# Patient Record
Sex: MALE | Race: OTHER | Employment: FULL TIME | URBAN - METROPOLITAN AREA
[De-identification: names, ages, dates, MRNs, and addresses within clinical notes are randomized per-mention and may not be internally consistent; named-entity substitution may affect disease eponyms.]

---

## 2017-02-12 ENCOUNTER — HOSPITAL ENCOUNTER (INPATIENT)
Age: 42
LOS: 2 days | Discharge: HOME | DRG: 897 | End: 2017-02-14
Attending: EMERGENCY MEDICINE | Admitting: INTERNAL MEDICINE
Payer: SELF-PAY

## 2017-02-12 DIAGNOSIS — F10.932 ALCOHOL WITHDRAWAL, WITH PERCEPTUAL DISTURBANCE (HCC): Primary | ICD-10-CM

## 2017-02-12 PROBLEM — F10.939 ALCOHOL WITHDRAWAL (HCC): Status: ACTIVE | Noted: 2017-02-12

## 2017-02-12 PROBLEM — R74.8 ELEVATED LIVER ENZYMES: Status: ACTIVE | Noted: 2017-02-12

## 2017-02-12 PROBLEM — R44.3 HALLUCINATIONS: Status: ACTIVE | Noted: 2017-02-12

## 2017-02-12 LAB
ALBUMIN SERPL BCP-MCNC: 4.9 G/DL (ref 3.4–5)
ALBUMIN/GLOB SERPL: 1.7 {RATIO} (ref 0.8–1.7)
ALP SERPL-CCNC: 40 U/L (ref 45–117)
ALT SERPL-CCNC: 146 U/L (ref 16–61)
AMPHET UR QL SCN: NEGATIVE
ANION GAP BLD CALC-SCNC: 10 MMOL/L (ref 3–18)
APPEARANCE UR: CLEAR
AST SERPL W P-5'-P-CCNC: 123 U/L (ref 15–37)
BARBITURATES UR QL SCN: NEGATIVE
BASOPHILS # BLD AUTO: 0 K/UL (ref 0–0.06)
BASOPHILS # BLD: 1 % (ref 0–2)
BENZODIAZ UR QL: NEGATIVE
BILIRUB DIRECT SERPL-MCNC: 0.3 MG/DL (ref 0–0.2)
BILIRUB SERPL-MCNC: 0.9 MG/DL (ref 0.2–1)
BILIRUB UR QL: NEGATIVE
BUN SERPL-MCNC: 9 MG/DL (ref 7–18)
BUN/CREAT SERPL: 12 (ref 12–20)
CALCIUM SERPL-MCNC: 8.7 MG/DL (ref 8.5–10.1)
CANNABINOIDS UR QL SCN: NEGATIVE
CHLORIDE SERPL-SCNC: 103 MMOL/L (ref 100–108)
CK MB CFR SERPL CALC: 0.6 % (ref 0–4)
CK MB SERPL-MCNC: 2.2 NG/ML (ref 0.5–3.6)
CK SERPL-CCNC: 368 U/L (ref 39–308)
CO2 SERPL-SCNC: 23 MMOL/L (ref 21–32)
COCAINE UR QL SCN: NEGATIVE
COLOR UR: YELLOW
CREAT SERPL-MCNC: 0.74 MG/DL (ref 0.6–1.3)
DIFFERENTIAL METHOD BLD: ABNORMAL
EOSINOPHIL # BLD: 0 K/UL (ref 0–0.4)
EOSINOPHIL NFR BLD: 0 % (ref 0–5)
ERYTHROCYTE [DISTWIDTH] IN BLOOD BY AUTOMATED COUNT: 12.5 % (ref 11.6–14.5)
ETHANOL SERPL-MCNC: <3 MG/DL (ref 0–3)
GLOBULIN SER CALC-MCNC: 2.9 G/DL (ref 2–4)
GLUCOSE SERPL-MCNC: 88 MG/DL (ref 74–99)
GLUCOSE UR STRIP.AUTO-MCNC: NEGATIVE MG/DL
HCT VFR BLD AUTO: 38.9 % (ref 36–48)
HDSCOM,HDSCOM: NORMAL
HGB BLD-MCNC: 13.4 G/DL (ref 13–16)
HGB UR QL STRIP: NEGATIVE
KETONES UR QL STRIP.AUTO: NEGATIVE MG/DL
LEUKOCYTE ESTERASE UR QL STRIP.AUTO: NEGATIVE
LYMPHOCYTES # BLD AUTO: 25 % (ref 21–52)
LYMPHOCYTES # BLD: 2 K/UL (ref 0.9–3.6)
MAGNESIUM SERPL-MCNC: 2 MG/DL (ref 1.8–2.4)
MCH RBC QN AUTO: 34.3 PG (ref 24–34)
MCHC RBC AUTO-ENTMCNC: 34.4 G/DL (ref 31–37)
MCV RBC AUTO: 99.5 FL (ref 74–97)
METHADONE UR QL: NEGATIVE
MONOCYTES # BLD: 1.1 K/UL (ref 0.05–1.2)
MONOCYTES NFR BLD AUTO: 13 % (ref 3–10)
NEUTS SEG # BLD: 5.1 K/UL (ref 1.8–8)
NEUTS SEG NFR BLD AUTO: 61 % (ref 40–73)
NITRITE UR QL STRIP.AUTO: NEGATIVE
OPIATES UR QL: NEGATIVE
PCP UR QL: NEGATIVE
PH UR STRIP: 5 [PH] (ref 5–8)
PHOSPHATE SERPL-MCNC: 3 MG/DL (ref 2.5–4.9)
PLATELET # BLD AUTO: 173 K/UL (ref 135–420)
PMV BLD AUTO: 10.9 FL (ref 9.2–11.8)
POTASSIUM SERPL-SCNC: 3.7 MMOL/L (ref 3.5–5.5)
PROT SERPL-MCNC: 7.8 G/DL (ref 6.4–8.2)
PROT UR STRIP-MCNC: NEGATIVE MG/DL
RBC # BLD AUTO: 3.91 M/UL (ref 4.7–5.5)
SODIUM SERPL-SCNC: 136 MMOL/L (ref 136–145)
SP GR UR REFRACTOMETRY: 1.01 (ref 1–1.03)
TROPONIN I SERPL-MCNC: <0.02 NG/ML (ref 0–0.04)
UROBILINOGEN UR QL STRIP.AUTO: 0.2 EU/DL (ref 0.2–1)
WBC # BLD AUTO: 8.2 K/UL (ref 4.6–13.2)

## 2017-02-12 PROCEDURE — 65270000029 HC RM PRIVATE

## 2017-02-12 PROCEDURE — 81003 URINALYSIS AUTO W/O SCOPE: CPT | Performed by: EMERGENCY MEDICINE

## 2017-02-12 PROCEDURE — 74011250636 HC RX REV CODE- 250/636: Performed by: EMERGENCY MEDICINE

## 2017-02-12 PROCEDURE — 93005 ELECTROCARDIOGRAM TRACING: CPT

## 2017-02-12 PROCEDURE — 96375 TX/PRO/DX INJ NEW DRUG ADDON: CPT

## 2017-02-12 PROCEDURE — 65660000000 HC RM CCU STEPDOWN

## 2017-02-12 PROCEDURE — 74011000250 HC RX REV CODE- 250: Performed by: EMERGENCY MEDICINE

## 2017-02-12 PROCEDURE — 96365 THER/PROPH/DIAG IV INF INIT: CPT

## 2017-02-12 PROCEDURE — 83735 ASSAY OF MAGNESIUM: CPT | Performed by: EMERGENCY MEDICINE

## 2017-02-12 PROCEDURE — 99285 EMERGENCY DEPT VISIT HI MDM: CPT

## 2017-02-12 PROCEDURE — 80076 HEPATIC FUNCTION PANEL: CPT | Performed by: EMERGENCY MEDICINE

## 2017-02-12 PROCEDURE — 84100 ASSAY OF PHOSPHORUS: CPT | Performed by: EMERGENCY MEDICINE

## 2017-02-12 PROCEDURE — 82550 ASSAY OF CK (CPK): CPT | Performed by: EMERGENCY MEDICINE

## 2017-02-12 PROCEDURE — 80048 BASIC METABOLIC PNL TOTAL CA: CPT | Performed by: EMERGENCY MEDICINE

## 2017-02-12 PROCEDURE — 85025 COMPLETE CBC W/AUTO DIFF WBC: CPT | Performed by: EMERGENCY MEDICINE

## 2017-02-12 PROCEDURE — 80307 DRUG TEST PRSMV CHEM ANLYZR: CPT | Performed by: EMERGENCY MEDICINE

## 2017-02-12 PROCEDURE — 94762 N-INVAS EAR/PLS OXIMTRY CONT: CPT

## 2017-02-12 RX ORDER — LORAZEPAM 2 MG/ML
2 INJECTION INTRAMUSCULAR
Status: COMPLETED | OUTPATIENT
Start: 2017-02-12 | End: 2017-02-12

## 2017-02-12 RX ORDER — LORAZEPAM 2 MG/ML
2 INJECTION INTRAMUSCULAR SEE ADMIN INSTRUCTIONS
Status: DISCONTINUED | OUTPATIENT
Start: 2017-02-12 | End: 2017-02-14 | Stop reason: SDUPTHER

## 2017-02-12 RX ORDER — HALOPERIDOL 5 MG/ML
5 INJECTION INTRAMUSCULAR
Status: COMPLETED | OUTPATIENT
Start: 2017-02-12 | End: 2017-02-12

## 2017-02-12 RX ORDER — LORAZEPAM 2 MG/ML
1 INJECTION INTRAMUSCULAR
Status: COMPLETED | OUTPATIENT
Start: 2017-02-12 | End: 2017-02-12

## 2017-02-12 RX ORDER — DIAZEPAM 10 MG/2ML
10 INJECTION INTRAMUSCULAR
Status: COMPLETED | OUTPATIENT
Start: 2017-02-12 | End: 2017-02-12

## 2017-02-12 RX ORDER — SODIUM CHLORIDE 0.9 % (FLUSH) 0.9 %
5-10 SYRINGE (ML) INJECTION AS NEEDED
Status: DISCONTINUED | OUTPATIENT
Start: 2017-02-12 | End: 2017-02-13

## 2017-02-12 RX ORDER — SODIUM CHLORIDE 0.9 % (FLUSH) 0.9 %
5-10 SYRINGE (ML) INJECTION EVERY 8 HOURS
Status: DISCONTINUED | OUTPATIENT
Start: 2017-02-12 | End: 2017-02-13

## 2017-02-12 RX ADMIN — LORAZEPAM 2 MG: 2 INJECTION, SOLUTION INTRAMUSCULAR; INTRAVENOUS at 15:25

## 2017-02-12 RX ADMIN — HALOPERIDOL LACTATE 5 MG: 5 INJECTION, SOLUTION INTRAMUSCULAR at 17:10

## 2017-02-12 RX ADMIN — FOLIC ACID: 5 INJECTION, SOLUTION INTRAMUSCULAR; INTRAVENOUS; SUBCUTANEOUS at 14:00

## 2017-02-12 RX ADMIN — DIAZEPAM 10 MG: 5 INJECTION, SOLUTION INTRAMUSCULAR; INTRAVENOUS at 17:10

## 2017-02-12 RX ADMIN — LORAZEPAM 1 MG: 2 INJECTION, SOLUTION INTRAMUSCULAR; INTRAVENOUS at 13:54

## 2017-02-12 RX ADMIN — Medication 10 ML: at 13:55

## 2017-02-12 RX ADMIN — DIAZEPAM 10 MG: 5 INJECTION, SOLUTION INTRAMUSCULAR; INTRAVENOUS at 19:50

## 2017-02-12 RX ADMIN — SODIUM CHLORIDE 1000 ML: 900 INJECTION, SOLUTION INTRAVENOUS at 13:54

## 2017-02-12 RX ADMIN — LORAZEPAM 2 MG: 2 INJECTION, SOLUTION INTRAMUSCULAR; INTRAVENOUS at 15:57

## 2017-02-12 NOTE — ED NOTES
Patient noted to have worse tremors than before ativan administration. ciwa of 21 and 2mg given. MD Iram Torre made aware.

## 2017-02-12 NOTE — H&P
Hospitalist Admission Note    NAME:  Johnny La   :   1975   MRN:   [id number]     Date/Time:  2017 5:41 PM    Subjective:     CHIEF COMPLAINT:    Chief Complaint   Patient presents with   Shelagh Liming    Delirium Tremens (DTS)       HISTORY OF PRESENT ILLNESS:     [facility] is a 46 y.o.  male,does not speak English,the family here translating. According to family members,patient drinks heavily since he was 27 y.old. He drinks 12 pack/daily of beer. Five days ago he decided to quit for the first time and next few days,he has become restless,shaky and he is seeing people and objects which don't exist.He is sweaty and has lost appetite. Albino Gamboa family has decided to bring him to the ER. Tox screen showed alcohol<3. Pt is admitted for alcohol withdrawal with delirium Tremens. Past Medical History   Diagnosis Date    Alcoholic [facility])         Social History   Substance Use Topics    Smoking status: Never Smoker    Smokeless tobacco: Not on file    Alcohol use Yes        History reviewed. No pertinent family history. No Known Allergies     Prior to Admission medications    Not on File       REVIEW OF SYSTEMS:    Constitutional:  Shaking,restlessness,loss of appetite  HEENT:  No headache or visual changes  Cardiovascular:  No chest pain, no palpitations. Respiratory:  No coughing, wheezing, or shortness of breath. GI:  No abdominal pain. No nausea or vomiting. No diarrhea  :  No hematuria or dysuria. No frequency, retention, urinary incontinence. Skin:  No rashes or moles  Neuro:  Confusion,tremors. Hematological:  No bruising or bleeding  Endocrine:  No diabetes or thyroid disease  Objective:   VITALS:       Visit Vitals    /85    Pulse (!) 105    Temp 99.5 °F (37.5 °C)    Resp 24    Ht 5' 4\" (1.626 m)    Wt 81.6 kg (180 lb)    SpO2 98%    BMI 30.9 kg/m2       O2 Device: Room air    PHYSICAL EXAM:   General:    Restless,shaky,anxious  HEENT:  Pupils equal.  Sclera anicteric. Conjunctiva pink. Mucous membranes                           moist  Neck:  Supple. Trachea midline. No accessory muscle use. No thyromegaly. No jugular venous distention  CV:                  Regular rate and rhythm. Lungs:   Clear to auscultation bilaterally. No Wheezing or Rhonchi. No rales. Normal to percussion  Abdomen:   Soft, non-tender. Not distended. Bowel sounds normal. No organomegaly  Extremities: No cyanosis. No edema. No clubbing  Neurologic: Alert and alert. Having involuntary upper extremities tremors. Skin:                Warm and dry. No rashes. Psych:            Agitated,restless,confused.       LAB DATA REVIEWED:    Recent Results (from the past 24 hour(s))   URINALYSIS W/ RFLX MICROSCOPIC    Collection Time: 02/12/17  1:04 PM   Result Value Ref Range    Color YELLOW      Appearance CLEAR      Specific gravity 1.012 1.005 - 1.030      pH (UA) 5.0 5.0 - 8.0      Protein NEGATIVE  NEG mg/dL    Glucose NEGATIVE  NEG mg/dL    Ketone NEGATIVE  NEG mg/dL    Bilirubin NEGATIVE  NEG      Blood NEGATIVE  NEG      Urobilinogen 0.2 0.2 - 1.0 EU/dL    Nitrites NEGATIVE  NEG      Leukocyte Esterase NEGATIVE  NEG     DRUG SCREEN, URINE    Collection Time: 02/12/17  1:04 PM   Result Value Ref Range    BENZODIAZEPINE NEGATIVE  NEG      BARBITURATES NEGATIVE  NEG      THC (TH-CANNABINOL) NEGATIVE  NEG      OPIATES NEGATIVE  NEG      PCP(PHENCYCLIDINE) NEGATIVE  NEG      COCAINE NEGATIVE  NEG      AMPHETAMINE NEGATIVE  NEG      METHADONE NEGATIVE       HDSCOM (NOTE)    EKG, 12 LEAD, INITIAL    Collection Time: 02/12/17  1:16 PM   Result Value Ref Range    Ventricular Rate 87 BPM    Atrial Rate 87 BPM    P-R Interval 158 ms    QRS Duration 102 ms    Q-T Interval 398 ms    QTC Calculation (Bezet) 478 ms    Calculated P Axis 44 degrees    Calculated R Axis 41 degrees    Calculated T Axis 24 degrees    Diagnosis       Normal sinus rhythm  Normal ECG  No previous ECGs available     CBC WITH AUTOMATED DIFF    Collection Time: 02/12/17  1:29 PM   Result Value Ref Range    WBC 8.2 4.6 - 13.2 K/uL    RBC 3.91 (L) 4.70 - 5.50 M/uL    HGB 13.4 13.0 - 16.0 g/dL    HCT 38.9 36.0 - 48.0 %    MCV 99.5 (H) 74.0 - 97.0 FL    MCH 34.3 (H) 24.0 - 34.0 PG    MCHC 34.4 31.0 - 37.0 g/dL    RDW 12.5 11.6 - 14.5 %    PLATELET 326 441 - 743 K/uL    MPV 10.9 9.2 - 11.8 FL    NEUTROPHILS 61 40 - 73 %    LYMPHOCYTES 25 21 - 52 %    MONOCYTES 13 (H) 3 - 10 %    EOSINOPHILS 0 0 - 5 %    BASOPHILS 1 0 - 2 %    ABS. NEUTROPHILS 5.1 1.8 - 8.0 K/UL    ABS. LYMPHOCYTES 2.0 0.9 - 3.6 K/UL    ABS. MONOCYTES 1.1 0.05 - 1.2 K/UL    ABS. EOSINOPHILS 0.0 0.0 - 0.4 K/UL    ABS. BASOPHILS 0.0 0.0 - 0.06 K/UL    DF AUTOMATED     METABOLIC PANEL, BASIC    Collection Time: 02/12/17  1:29 PM   Result Value Ref Range    Sodium 136 136 - 145 mmol/L    Potassium 3.7 3.5 - 5.5 mmol/L    Chloride 103 100 - 108 mmol/L    CO2 23 21 - 32 mmol/L    Anion gap 10 3.0 - 18 mmol/L    Glucose 88 74 - 99 mg/dL    BUN 9 7.0 - 18 MG/DL    Creatinine 0.74 0.6 - 1.3 MG/DL    BUN/Creatinine ratio 12 12 - 20      GFR est AA >60 >60 ml/min/1.73m2    GFR est non-AA >60 >60 ml/min/1.73m2    Calcium 8.7 8.5 - 10.1 MG/DL   HEPATIC FUNCTION PANEL    Collection Time: 02/12/17  1:29 PM   Result Value Ref Range    Protein, total 7.8 6.4 - 8.2 g/dL    Albumin 4.9 3.4 - 5.0 g/dL    Globulin 2.9 2.0 - 4.0 g/dL    A-G Ratio 1.7 0.8 - 1.7      Bilirubin, total 0.9 0.2 - 1.0 MG/DL    Bilirubin, direct 0.3 (H) 0.0 - 0.2 MG/DL    Alk.  phosphatase 40 (L) 45 - 117 U/L    AST (SGOT) 123 (H) 15 - 37 U/L    ALT (SGPT) 146 (H) 16 - 61 U/L   CARDIAC PANEL,(CK, CKMB & TROPONIN)    Collection Time: 02/12/17  1:29 PM   Result Value Ref Range     (H) 39 - 308 U/L    CK - MB 2.2 0.5 - 3.6 ng/ml    CK-MB Index 0.6 0.0 - 4.0 %    Troponin-I, Qt. <0.02 0.0 - 0.045 NG/ML   ETHYL ALCOHOL    Collection Time: 02/12/17  1:29 PM   Result Value Ref Range ALCOHOL(ETHYL),SERUM <3 0 - 3 MG/DL   MAGNESIUM    Collection Time: 02/12/17  1:29 PM   Result Value Ref Range    Magnesium 2.0 1.8 - 2.4 mg/dL   PHOSPHORUS    Collection Time: 02/12/17  1:29 PM   Result Value Ref Range    Phosphorus 3.0 2.5 - 4.9 MG/DL       Assessment/Plan:      Principal Problem:    Alcohol withdrawal ([address] (2/12/2017)    Active Problems:    Elevated liver enzymes (2/12/2017)      Hallucinations (2/12/2017)      Delirium Tremens  ___________________________________________________  PLAN:    1. Alcohol withdrawal with delirium Tremens    -On CIWA protocol    -On MVI,Thiamine,Folic acid    -Life coaching    -PT/OT    -Refer to group therapy on discharge  2. Elevated liver enzymes    -Due to alcohol abuse. Will monitor  DVT prophylaxis  Full code    ___________________________________________________  Admitting Physician: Ramírez Judge MD

## 2017-02-12 NOTE — IP AVS SNAPSHOT
[address] [place] [address] 
[phone] Patient: Jose Hooks MRN: [id number] :1975 You are allergic to the following No active allergies Immunizations Administered for This Admission Name Date Influenza Vaccine (Quad) PF 2017 Recent Documentation Height Weight BMI Smoking Status 1.626 m 81.6 kg 30.9 kg/m2 Never Smoker Emergency Contacts Name Discharge Info Relation Home Work Mobile Delano Lauren DISCHARGE CAREGIVER [3] Brother [24]   [phone] About your hospitalization You were admitted on:  2017 You last received care in the:  [address] NEURO MED You were discharged on:  2017 Unit phone number:  [phone] Why you were hospitalized Your primary diagnosis was:  Alcohol Withdrawal (Hcc) Your diagnoses also included:  Elevated Liver Enzymes, Hallucinations Providers Seen During Your Hospitalizations Provider Role Specialty Primary office phone Salome Christensen DO Attending Provider Emergency Medicine [phone] Bran Parra DO Attending Provider Emergency Medicine [phone] Tacho Terry MD Attending Provider Internal Medicine [phone] Abdoulaye Gaines MD Attending Provider Internal Medicine [phone] Your Primary Care Physician (PCP) Primary Care Physician Office Phone Office Fax Darlyn Washington [phone] Follow-up Information Follow up With Details Comments Contact Info Taiwo Doll NP On 2017 at 12:15 pm [address] [address] 
[phone] [facility]  Schedule an appointment as soon as possible for a visit  [facility] [id number] [phone] Current Discharge Medication List  
  
 START taking these medications Dose & Instructions Dispensing Information Comments Morning Noon Evening Bedtime  
 diazePAM 5 mg tablet Commonly known as:  VALIUM Your next dose is: Today, Tomorrow Other:  _________ Take 1 tab every 6 hrs for 1 day then every 12 hrs for 1 day then stop Quantity:  6 Tab Refills:  0 Where to Get Your Medications Information on where to get these meds will be given to you by the nurse or doctor. ! Ask your nurse or doctor about these medications  
  diazePAM 5 mg tablet Discharge Instructions DISCHARGE SUMMARY from Nurse The following personal items are in your possession at time of discharge: 
 
Dental Appliances: None Visual Aid: None Home Medications: None Jewelry: None PATIENT INSTRUCTIONS: 
 
 
F-face looks uneven A-arms unable to move or move unevenly S-speech slurred or non-existent T-time-call 911 as soon as signs and symptoms begin-DO NOT go Back to bed or wait to see if you get better-TIME IS BRAIN. Warning Signs of HEART ATTACK Call 911 if you have these symptoms: 
? Chest discomfort. Most heart attacks involve discomfort in the center of the chest that lasts more than a few minutes, or that goes away and comes back. It can feel like uncomfortable pressure, squeezing, fullness, or pain. ? Discomfort in other areas of the upper body. Symptoms can include pain or discomfort in one or both arms, the back, neck, jaw, or stomach. ? Shortness of breath with or without chest discomfort. ? Other signs may include breaking out in a cold sweat, nausea, or lightheadedness. Don't wait more than five minutes to call [address]! Fast action can save your life. Calling 911 is almost always the fastest way to get lifesaving treatment. Emergency Medical Services staff can begin treatment when they arrive  up to an hour sooner than if someone gets to the hospital by car. The discharge information has been reviewed with the patient. The patient verbalized understanding. Discharge medications reviewed with the patient and appropriate educational materials and side effects teaching were provided. Patient armband removed and shredded MyChart Activation Thank you for requesting access to [email or web address]. Please follow the instructions below to securely access and download your online medical record. [email or web address] allows you to send messages to your doctor, view your test results, renew your prescriptions, schedule appointments, and more. How Do I Sign Up? 1. In your internet browser, go to [email or web address].Little Borrowed Dress 
2. Click on the First Time User? Click Here link in the Sign In box. You will be redirect to the New Member Sign Up page. 3. Enter your marker.to Access Code exactly as it appears below. You will not need to use this code after youve completed the sign-up process. If you do not sign up before the expiration date, you must request a new code. [email or web address] Access Code: [email or web address] Expires: 2017  1:40 PM (This is the date your marker.to access code will ) 4. Enter the last four digits of your Social Security Number (xxxx) and Date of Birth (mm/dd/yyyy) as indicated and click Submit. You will be taken to the next sign-up page. 5. Create a marker.to ID. This will be your marker.to login ID and cannot be changed, so think of one that is secure and easy to remember. 6. Create a marker.to password. You can change your password at any time. 7. Enter your Password Reset Question and Answer. This can be used at a later time if you forget your password. 8. Enter your e-mail address. You will receive e-mail notification when new information is available in [address] 9. Click Sign Up. You can now view and download portions of your medical record. 10. Click the Download Summary menu link to download a portable copy of your medical information. Additional Information If you have questions, please visit the Frequently Asked Questions section of the [employer] website at [email or web address] Remember, [employer] is NOT to be used for urgent needs. For medical emergencies, dial 911. Desintoxicación y síndrome de abstinencia del alcohol: Instrucciones de cuidado - [ Alcohol Detoxification and Withdrawal: Care Instructions ] Instrucciones de cuidado Si usted nicanor alcohol de forma regular y [place] as[place] de hacerlo de  repentina, puede experimentar algunos problemas físicos y emocionales mientras el alcohol desaparece de huang cuerpo. La palabra desintoxicación significa eliminar el alcohol de huang cuerpo. A los problemas físicos y emocionales que podrían ocurrir larry la desintoxicación se les conoce jayden síndrome de abstinencia. Los síntomas del síndrome de abstinencia pueden ser alarmantes y peligrosos. Entre los síntomas leves se encuentran náuseas y vómito, sudoración, temblores y preocupación intensa. Entre los síntomas graves se encuentran confusión e irritabilidad, sentir cosas en huang cuerpo que no están allí, bolivar y oír cosas que no existen y tener temblores. Puede que hasta tenga convulsiones. Si dia síntomas se vuelven graves usted debe visitar a un [place] que beben grandes cantidades de alcohol no deberían intentar la desintoxicación en huang hogar. [address] a un síndrome grave de abstinencia del alcohol. Los síntomas del síndrome de abstinencia de alcohol pueden comenzar de 4 a 12 horas después de dejar de beber alcohol.  [address] puede ser [facility] síntomas demoren en presentarse varios días después de huang última bebida. Los síntomas del síndrome de abstinencia de alcohol pueden durar unos cuantos días. Dejar de beber alcohol es difícil. Bhumi, cuando haya eliminado el alcohol de huang cuerpo, usted podrá iniciar arsenio nueva etapa de huang marva, sara de la carga de la [place]. La atención de seguimiento es arsenio parte clave de huang tratamiento y seguridad. Asegúrese de hacer y acudir a todas las citas, y llame a huang médico si está teniendo problemas. También es arsenio buena idea saber los resultados de dia exámenes y mantener arsenio lista de los medicamentos que logan. Cómo puede cuidarse en el hogar? · Antes de comenzar, hable con huang médico sobre huang plan de dejar de beber. Asegúrese de ser completamente honesto con huang médico acerca de cuánto ha estado bebiendo. Huang médico sabrá si necesita desintoxicarse en un centro médico supervisado. · Lingle los medicamentos exactamente según las indicaciones. Llame a huang médico si justin estar teniendo un problema con huang medicamento. · Asegúrese de que alguien en quien usted confía lo acompañe todo el tiempo. Stephany que dia amigos y familiares tomen turnos para quedarse con usted hasta que termine la desintoxicación. · Coloque arsenio lista de números de emergencia cerca del teléfono. Esta debe incluir los teléfonos de huang médico, la [facility], el hospital y la lou[place] de urgencias más cercanos, y vecinos que puedan ayudar si es necesario. · Asegúrese de eliminar todo el alcohol que tenga en la casa antes de comenzar. [place] incluye bebidas y medicamentos, alcohol isopropílico (de frotar) y ciertos saborizantes jayden el extracto de vainilla. · Manténgase alejado de dia \"compañeros de bebida\" mientras hace la desintoxicación. · Mantenga huang ambiente en calma. La christi y la [place], así jayden un lugar cómodo para sentarse o acostarse pueden facilitar el proceso.  
· Sofi muchos líquidos y coma refrigerios jayden frutas, queso y galletas saladas, y \"pretzels\". Los alimentos ricos en carbohidratos podrían ayudar a reducir la ansiedad por beber alcohol. · Trate de comprender que la desintoxicación será difícil. · Tenga en cuenta que las personas que lo vigilan larry la desintoxicación están ahí para ayudar. Explíqueles antes de comenzar que usted podría empezar a actuar jayden arsenio persona diferente hasta que termine la desintoxicación. · Piense en unirse a un casie de apoyo jayden Alcohólicos Anónimos. Compartir dia experiencias con [facility] que enfrentan problemas similares puede ayudarle a sentirse menos abrumado. · Guarde los números para estas líneas telefónicas nacionales de ayuda para prevenir el suicidio: 2-143-356-TALK ([phone]) y 8-411-JLPQSHH ([phone]). Si usted o alguien que conoce habla acerca del suicidio o de sentirse desesperanzado, busque ayuda inmediatamente. Cuándo debe pedir ayuda? Llame al 911 en cualquier momento que considere que necesita atención de [place]. Por ejemplo, llame si: 
· Siente que no puede dejar de lastimarse a sí mismo o a otra persona. · Vomita muchas veces y no puede dejar de hacerlo. · Vomita joselo o algo parecido a granos de café molido. · Tiene problemas para respirar o respira muy rápido. · Huang corazón late más de 120 veces por minuto y la frecuencia no disminuye. · Tiene dolor de pecho. · Tiene convulsiones. · Ve o siente cosas que no existen (alucina). Si usted está cuidando a alguien que está pasando por arsenio desintoxicación, llame si: 
· La persona se desmaya (pierde el conocimiento). · La persona ve o siente cosas que no existen y ve u oye las mismas cosas muchas veces. · La persona está muy agitada y no se calma. · La persona se vuelve violenta o amenaza con ser Herrera Nghia. · La persona tiene convulsiones. Llame a huang médico ahora mismo o busque atención médica inmediata si: · Tiene fiebre cuco. · Tiene dolor abdominal intenso. · Tiembla mucho. Preste especial atención a los cambios en huang armando y asegúrese de comunicarse con huang médico si: 
· No mejora jayden se esperaba. Dónde puede encontrar más información en inglés? April Carvalho a [email or web address] Gege Zaragoza C412 en la búsqueda para aprender más acerca de \"Desintoxicación y síndrome de abstinencia del alcohol: Instrucciones de cuidado - [ Alcohol Detoxification and Withdrawal: Care Instructions ]. \" 
Revisado: 25 godwin, 2016 Versión del contenido: 11.1 © 0145-5620 Healthwise, Incorporated. Las instrucciones de cuidado fueron adaptadas bajo licencia por [employer]ctions (which disclaims liability or warranty for this information). Si usted tiene [place] afección médica o sobre estas instrucciones, siempre pregunte a huang profesional de armando. Healthwise, Incorporated niega toda garantía o responsabilidad por huang uso de esta información. Discharge Orders None [employer] Announcement We are excited to announce that we are making your provider's discharge notes available to you in [employer]. You will see these notes when they are completed and signed by the physician that discharged you from your recent hospital stay. If you have any questions or concerns about any information you see in [employer], please call the Health Information Department where you were seen or reach out to your Primary Care Provider for more information about your plan of care. Introducing [facility]! Micah Guzmán introduce portal paciente [facility] . Ahora se puede acceder a partes de huang expediente médico, enviar por correo electrónico la oficina de huang médico y solicitar renovaciones de medicamentos en línea. En huang navegador de Internet , Christian Le a [email or web address] Stephany clic en el usuario por [place]? Ale Colindrese clic aquí en la sesión Beth Pierre. Verá la página de registro Ray. Ingrese huang código de [employer] crescencio y jayden aparece a continuación. Usted no tendrá que [employer] código después de gudelia completado el proceso de registro . Si usted no se inscribe antes de la fecha de caducidad , debe solicitar un nuevo código. · MyChart Código de acceso : [id number] Expires: 5/13/2017  1:40 PM 
 
Ingresa los últimos cuatro dígitos de huang Número de Seguro Social ( xxxx ) y fecha de nacimiento ( dd / mm / aaaa ) jayden se indica y stephany clic en Enviar. Usted será llevado a la siguiente página de registro . Crear un ID MyChart . Esta será huang ID de inicio de sesión de MyChart y no puede ser [place] , por lo que pensar en arsenio que es Germain [place]use y fácil de recordar . Crear arsenio contraseña MyChart . Usted puede cambiar huang contraseña en cualquier momento . Ingrese huang Password Reset de preguntas y [place] . [place] se puede utilizar en un momento posterior si usted olvida huang contraseña. Introduzca huang dirección de correo electrónico . Marcos Neff recibirá arsenio notificación por correo electrónico cuando la nueva información está disponible en MyChart . Michelle Patee clic en Registrarse. Gorge Prajapati bolivar y descargar porciones de huang expediente médico. 
Stephany clic en el enlace de descarga del menú Resumen para descargar arsenio copia portátil de huang información médica . Si tiene [employer] , por favor visite la sección de preguntas frecuentes del sitio web MyChart . Recuerde, MyChart NO es que se utilizará para las necesidades urgentes. Para emergencias médicas , llame al 911 . Ahora disponible en huang iPhone y Android ! General Information Please provide this summary of care documentation to your next provider. Patient Signature:  ____________________________________________________________ Date:  ____________________________________________________________  
  
Matt Sport Provider Signature:  ____________________________________________________________ Date:  ____________________________________________________________  
  
  
   
  
[address] [facility] [address] [phone] Patient: Gunjan Butler MRN: [id number] :1975 Tiene alergias a lo siguiente No tiene alergias Marlinda Rajput Administradas en esta admisión:    
   
 Leia Every Influenza Vaccine (Quad) PF 2017 Documentación recientes Height Weight BMI (IMC) Estatus de tabaquísmo 1.626 m 81.6 kg 30.9 kg/m2 Never Smoker Emergency Contacts Name Discharge Info Relation Home Work Mobile Delano Lauren DISCHARGE CAREGIVER [3] Brother [24]   [phone] Sobre weaver hospitalización Le admitieron el:  2017 Weaver tratamiento más reciente [facility]:  [address] NEURO MED Le dieron de cuco el:  2017 Número de teléfono de la unidad:  [phone] Por qué le ingresaron Weaver diagnosis primaria es:  Alcohol Withdrawal (Hcc) Weaver diagnosis también incluye:  Elevated Liver Enzymes, Hallucinations Proveedores de verse larry dia hospitalizaciones Personal Médico Rol Especialidad Teléfono principal de la oficina Karen Chow, DO Attending Provider Emergency Medicine [phone] Ady Peterson DO Attending Provider Emergency Medicine [phone] Sujey Negrete MD Attending Provider Internal Medicine [phone] Linda Moulton MD Attending Provider Internal Medicine [phone] Weaver médico de atención primaria (PCP ) Primary Care Physician Office Phone Office Fax Kelley [facility] [phone] Follow-up Information Follow up With Details Comments Contact Info Bernardino Petit NP On 2017 at 12:15 pm [address] [address] [address] [id number] [phone]  [address] Schedule an appointment as soon as possible for a visit  [address] [place] [address] [phone] Aprobación de la gestión actual lista de medicamentos EMPIECE a alysia RoomActually Dosis e instrucciones Información de dispensación Comentarios Morning Noon Evening Bedtime  
 diazePAM 5 mg tablet También conocido jayden:  VALIUM Your next dose is: Today, Tomorrow Other:  _________ Take 1 tab every 6 hrs for 1 day then every 12 hrs for 1 day then stop  
 cantidad:  6 Tab  
recargas:  0 Dónde puede recoger dia medicamentos Información sobre dónde obtener estos medicamentos se le dará a usted por la enfermera o el médico.   
 ! Pregunte a huang médico o enfermero/a sobre estos medicamentos  
  diazePAM 5 mg tablet Discharge Instructions DISCHARGE SUMMARY from Nurse The following personal items are in your possession at time of discharge: 
 
Dental Appliances: None Visual Aid: None Home Medications: None Jewelry: None PATIENT INSTRUCTIONS: 
 
 
F-face looks uneven A-arms unable to move or move unevenly S-speech slurred or non-existent T-time-call 911 as soon as signs and symptoms begin-DO NOT go Back to bed or wait to see if you get better-TIME IS BRAIN. Warning Signs of HEART ATTACK Call 911 if you have these symptoms: 
? Chest discomfort. Most heart attacks involve discomfort in the center of the chest that lasts more than a few minutes, or that goes away and comes back. It can feel like uncomfortable pressure, squeezing, fullness, or pain. ? Discomfort in other areas of the upper body.  Symptoms can include pain or discomfort in one or both arms, the back, neck, jaw, or stomach. ? Shortness of breath with or without chest discomfort. ? Other signs may include breaking out in a cold sweat, nausea, or lightheadedness. Don't wait more than five minutes to call [address]! Fast action can save your life. Calling 911 is almost always the fastest way to get lifesaving treatment. Emergency Medical Services staff can begin treatment when they arrive  up to an hour sooner than if someone gets to the hospital by car. The discharge information has been reviewed with the patient. The patient verbalized understanding. Discharge medications reviewed with the patient and appropriate educational materials and side effects teaching were provided. Patient armband removed and shredded MyChart Activation Thank you for requesting access to e|tab. Please follow the instructions below to securely access and download your online medical record. e|tab allows you to send messages to your doctor, view your test results, renew your prescriptions, schedule appointments, and more. How Do I Sign Up? 1. In your internet browser, go to www."[email or web address] 
2. Click on the First Time User? Click Here link in the Sign In box. You will be redirect to the New Member Sign Up page. 3. Enter your e|tab Access Code exactly as it appears below. You will not need to use this code after youve completed the sign-up process. If you do not sign up before the expiration date, you must request a new code. e|tab Access Code: [email or web address] Expires: 2017  1:40 PM (This is the date your e|tab access code will ) 4. Enter the last four digits of your Social Security Number (xxxx) and Date of Birth (mm/dd/yyyy) as indicated and click Submit. You will be taken to the next sign-up page. 5. Create a e|tab ID.  This will be your e|tab login ID and cannot be changed, so think of one that is secure and easy to remember. 6. Create a ANDalyze password. You can change your password at any time. 7. Enter your Password Reset Question and Answer. This can be used at a later time if you forget your password. 8. Enter your e-mail address. You will receive e-mail notification when new information is available in [address] 9. Click Sign Up. You can now view and download portions of your medical record. 10. Click the Download Summary menu link to download a portable copy of your medical information. Additional Information If you have questions, please visit the Frequently Asked Questions section of the [employer] website at [email or web address] Remember, [employer] is NOT to be used for urgent needs. For medical emergencies, dial 911. Desintoxicación y síndrome de abstinencia del alcohol: Instrucciones de cuidado - [ Alcohol Detoxification and Withdrawal: Care Instructions ] Instrucciones de cuidado Si usted nicanor alcohol de forma regular y [place] asia de hacerlo de  repentina, puede experimentar algunos problemas físicos y emocionales mientras el alcohol desaparece de huang cuerpo. La palabra desintoxicación significa eliminar el alcohol de huang cuerpo. A los problemas físicos y emocionales que podrían ocurrir larry la desintoxicación se les conoce jayden síndrome de abstinencia. Los síntomas del síndrome de abstinencia pueden ser alarmantes y peligrosos. Entre los síntomas leves se encuentran náuseas y vómito, sudoración, temblores y preocupación intensa. Entre los síntomas graves se encuentran confusión e irritabilidad, sentir cosas en huang cuerpo que no están allí, bolivar y oír cosas que no existen y tener temblores. Puede que hasta tenga convulsiones. Si dia síntomas se vuelven graves usted debe visitar a un [place] que beben grandes cantidades de alcohol no deberían intentar la desintoxicación en huang hogar.  [address] [facility] Alver Sack a un síndrome grave de abstinencia del alcohol. Los síntomas del síndrome de abstinencia de alcohol pueden comenzar de 4 a 12 horas después de dejar de beber alcohol. También puede ser que estos síntomas demoren en presentarse varios días después de huang última bebida. Los síntomas del síndrome de abstinencia de alcohol pueden durar unos cuantos días. Dejar de beber alcohol es difícil. Bhumi, cuando haya eliminado el alcohol de huang cuerpo, usted podrá iniciar arsenio nueva etapa de huang marva, sara de la carga de la [place]. La atención de seguimiento es arsenio parte clave de huang tratamiento y seguridad. Asegúrese de hacer y acudir a todas las citas, y llame a huang médico si está teniendo problemas. También es arsenio buena idea saber los resultados de dia exámenes y mantener arsenio lista de los medicamentos que logan. Cómo puede cuidarse en el hogar? · Antes de comenzar, hable con huang médico sobre huang plan de dejar de beber. Asegúrese de ser completamente honesto con huang médico acerca de cuánto ha estado bebiendo. Huang médico sabrá si necesita desintoxicarse en un centro médico supervisado. · [place] los medicamentos exactamente según las indicaciones. Llame a huang médico si justin estar teniendo un problema con huang medicamento. · Asegúrese de que alguien en quien usted confía lo acompañe todo el tiempo. Stephany que dia amigos y familiares tomen turnos para quedarse con usted hasta que termine la desintoxicación. · Coloque arsenio lista de números de emergencia cerca del teléfono. Esta debe incluir los teléfonos de huang médico, la [facility], el hospital y la [place] de urgencias más cercanos, y vecinos que puedan ayudar si es necesario. · Asegúrese de eliminar todo el alcohol que tenga en la casa antes de comenzar. [place] incluye bebidas y medicamentos, alcohol isopropílico (de frotar) y ciertos saborizantes jayden el extracto de vainilla. · Manténgase alejado de dia \"compañeros de bebida\" mientras hace la desintoxicación. · Mantenga huang ambiente en calma. La christi y la [place], así jayden un lugar cómodo para sentarse o acostarse pueden facilitar el proceso. · Sofi muchos líquidos y coma refrigerios jayden frutas, queso y [place], y \"pretzels\". Los alimentos ricos en carbohidratos podrían ayudar a reducir la ansiedad por beber alcohol. · Trate de comprender que la desintoxicación será difícil. · Tenga en cuenta que las personas que lo vigilan larry la desintoxicación están ahí para ayudar. Explíqueles antes de comenzar que usted podría empezar a actuar jayden arsenio persona diferente hasta que termine la desintoxicación. · Piense en unirse a un casie de apoyo jayden Alcohólicos Anónimos. Compartir dia experiencias con [facility] que enfrentan problemas similares puede ayudarle a sentirse menos abrumado. · Guarde los números para estas líneas telefónicas nacionales de ayuda para prevenir el suicidio: [phone]-TALK ([phone]) y [phone]-[phone] ([phone]). Si usted o alguien que conoce habla acerca del suicidio o de sentirse desesperanzado, busque ayuda inmediatamente. Cuándo debe pedir ayuda? Llame al 911 en cualquier momento que considere que necesita atención de [place]. Por ejemplo, llame si: 
· Siente que no puede dejar de lastimarse a sí mismo o a otra persona. · Vomita muchas veces y no puede dejar de hacerlo. · Vomita joselo o algo parecido a granos de café molido. · Tiene problemas para respirar o respira muy rápido. · Huang corazón late más de 120 veces por minuto y la frecuencia no disminuye. · Tiene dolor de pecho. · Tiene convulsiones. · Ve o siente cosas que no existen (alucina). Si usted está cuidando a alguien que está pasando por arsenio desintoxicación, llame si: 
· La persona se desmaya (pierde el conocimiento). · La persona ve o siente cosas que no existen y ve u oye las mismas cosas muchas veces. · La persona está muy agitada y no se calma. · La persona se vuelve violenta o amenaza con ser Lewis Safer. · La persona tiene convulsiones. Llame a huang médico ahora mismo o busque atención médica inmediata si: · Tiene fiebre cuco. · Tiene dolor abdominal intenso. · Tiembla mucho. Preste especial atención a los cambios en huang armando y asegúrese de comunicarse con huang médico si: 
· No mejora jayden se esperaba. Dónde puede encontrar más información en inglés? Aki La a [email or web address] Haven Anger O788 en la búsqueda para aprender más acerca de \"Desintoxicación y síndrome de abstinencia del alcohol: Instrucciones de cuidado - [ Alcohol Detoxification and Withdrawal: Care Instructions ]. \" 
Revisado: 25 godwin, 2016 Versión del contenido: 11.1 © 5362-2355 Healthwise, Incorporated. Las instrucciones de cuidado fueron adaptadas bajo licencia por [employer] (which disclaims liability or warranty for this information). Si usted tiene [place] afección médica o sobre estas instrucciones, siempre pregunte a huang profesional de armando. Healthwise, Incorporated niega toda garantía o responsabilidad por huang uso de esta información. Discharge Orders [employer] Announcement We are excited to announce that we are making your provider's discharge notes available to you in [employer]. You will see these notes when they are completed and signed by the physician that discharged you from your recent hospital stay. If you have any questions or concerns about any information you see in [employer], please call the Health Information Department where you were seen or reach out to your Primary Care Provider for more information about your plan of care. Introducing [facility]! [facility] introduce portal paciente [employer] . Ahora se puede acceder a partes de huang expediente médico, enviar por correo electrónico la oficina de huang médico y solicitar renovaciones de medicamentos en línea. En huang navegador de Internet , Meenakshi Barajas a [email or web address] Sandie clic en el usuario por Ole Mckenzie? Heaven Shipmans clic aquí en la sesión Mayi Favre. Verá la página de registro [employer]. Ingrese huang código de [employer] crescencio y jayden aparece a continuación. Usted no tendrá que [employer] código después de gudelia completado el proceso de registro . Si usted no se inscribe antes de la fecha de caducidad , debe solicitar un nuevo código. · MyChart Código de acceso : [id number] Expires: 5/13/2017  1:40 PM 
 
Ingresa los últimos cuatro dígitos de huang Número de Seguro Social ( xxxx ) y fecha de nacimiento ( dd / mm / aaaa ) jayden se indica y sandie clic en Enviar. Usted será llevado a la siguiente página de registro . Crear un ID MyChart . Esta será huang ID de inicio de sesión de MyChart y no puede ser [place] , por lo que pensar en arsenio que es [place]ra [place]ill[place] y fácil de recordar . Crear arsenio contraseña MyChart . Usted puede cambiar huang contraseña en cualquier momento . Ingrese huang Password Reset de preguntas y Zafar . Adel se puede utilizar en un momento posterior si usted olvida huang contraseña. Introduzca huang dirección de correo electrónico . Dolly López recibirá arsenio notificación por correo electrónico cuando la nueva información está disponible en MyChart . Ryan klineic en Registrarse. Kaylyn Millet bolivar y descargar porciones de huang expediente médico. 
Sandie clic en el enlace de descarga del menú Resumen para descargar arsenio copia portátil de huang información médica . Si tiene [employer] , por favor visite la sección de preguntas frecuentes del sitio web MyChart . Recuerde, MyChart NO es que se utilizará para las necesidades urgentes. Para emergencias médicas , llame al 911 . Ahora disponible en huang iPhone y Android ! General Information Please provide this summary of care documentation to your next provider.  
  
  
    
    
 Patient Signature: ____________________________________________________________ Date:  ____________________________________________________________  
  
Angely Sonora Provider Signature:  ____________________________________________________________ Date:  ____________________________________________________________

## 2017-02-12 NOTE — ED NOTES
After administration of ativan patient has begun to be worse. Administration of other medications is necessary.

## 2017-02-12 NOTE — IP AVS SNAPSHOT
Current Discharge Medication List  
  
Take these medications as directed Dose & Instructions Dispensing Information Comments Morning Noon Evening Bedtime  
 diazePAM 5 mg tablet Commonly known as:  VALIUM Your next dose is: Today, Tomorrow Other:  ____________ Take 1 tab every 6 hrs for 1 day then every 12 hrs for 1 day then stop Quantity:  6 Tab Refills:  0 Where to Get Your Medications Information about where to get these medications is not yet available ! Ask your nurse or doctor about these medications  
  diazePAM 5 mg tablet

## 2017-02-12 NOTE — ED NOTES
Patient in bed resting (snoring, mouth open wide). No acute distress noted. Sister remains at bedside. Monitoring equipment replaced.

## 2017-02-12 NOTE — ED PROVIDER NOTES
HPI Comments: 1:07 PM Josey Bates is a 46 y.o. male who presents to ED complaining of tremors due to alcohol withdrawal. Pt also complains of confusion and hallucinations. Pt relative states that he stopped drinking about 5 days ago and since then he has been shaking and hallucinating. The pt relative states that the pt is seeing bees and that the pt has no history of hallucinations. Pt relative also mentions that the pt usually drinks 12 packs a day. Pt has NKDA or medical problems. There are no further complaints at this time. PCP: No primary care provider on file. Patient is a 46 y.o. male presenting with spasticity and delirium tremens. The history is provided by the patient. A  was used. Shaking      Delirium Tremens (DTS)          Past Medical History:   Diagnosis Date    Alcoholic ([address]        History reviewed. No pertinent past surgical history. History reviewed. No pertinent family history. Social History     Social History    Marital status: SINGLE     Spouse name: N/A    Number of children: N/A    Years of education: N/A     Occupational History    Not on file. Social History Main Topics    Smoking status: Never Smoker    Smokeless tobacco: Not on file    Alcohol use Yes    Drug use: No    Sexual activity: Not on file     Other Topics Concern    Not on file     Social History Narrative    No narrative on file         ALLERGIES: Review of patient's allergies indicates no known allergies. Review of Systems    Vitals:    02/12/17 1256   BP: 124/78   Pulse: 97   Resp: 18   Temp: 99.5 °F (37.5 °C)   SpO2: 100%   Weight: 81.6 kg (180 lb)   Height: 5' 4\" (1.626 m)            Physical Exam   Constitutional: He is oriented to person, place, and time. He appears well-developed and well-nourished. HENT:   Head: Normocephalic and atraumatic.   tongue fasciulations   Neck: Neck supple. No JVD present. Cardiovascular: Regular rhythm. Tachycardia present. Pulmonary/Chest: Effort normal and breath sounds normal. No respiratory distress. Abdominal: Soft. He exhibits no distension. There is no tenderness. There is no rebound and no guarding. Musculoskeletal: He exhibits no edema. Neurological: He is alert and oriented to person, place, and time. Bilateral hand tremors   Skin: Skin is warm and dry. No erythema. Psychiatric: Judgment normal.        MDM  Number of Diagnoses or Management Options  Diagnosis management comments: 47 y/o male presents with tremors and hallucinations, s/p 5 days since last etoh  Concern for DT, will check labs, give ativan, initiate ciwa  Ordered banana bag for thiamine and folate repletion. Amount and/or Complexity of Data Reviewed  Clinical lab tests: ordered and reviewed  Tests in the medicine section of CPT®: reviewed and ordered      ED Course       Procedures           Vitals:  Patient Vitals for the past 12 hrs:   Temp Pulse Resp BP SpO2   02/12/17 1256 99.5 °F (37.5 °C) 97 18 124/78 100 %   Pulse ox interpreted WNL      Medications ordered:   Medications   sodium chloride (NS) flush 5-10 mL (10 mL IntraVENous Given 2/12/17 1355)   sodium chloride (NS) flush 5-10 mL (not administered)   LORazepam (ATIVAN) injection 2 mg (not administered)   0.9% sodium chloride 1,000 mL with mvi, adult no. 4 with vit K 10 mL, thiamine 005 mg, folic acid 1 mg infusion ( IntraVENous New Bag 2/12/17 1400)   sodium chloride 0.9 % bolus infusion 1,000 mL (1,000 mL IntraVENous New Bag 2/12/17 1354)   LORazepam (ATIVAN) injection 1 mg (1 mg IntraVENous Given 2/12/17 1354)         Lab findings:  Recent Results (from the past 12 hour(s))   URINALYSIS W/ RFLX MICROSCOPIC    Collection Time: 02/12/17  1:04 PM   Result Value Ref Range    Color YELLOW      Appearance CLEAR      Specific gravity 1.012 1.005 - 1.030      pH (UA) 5.0 5.0 - 8.0      Protein NEGATIVE  NEG mg/dL    Glucose NEGATIVE  NEG mg/dL    Ketone NEGATIVE  NEG mg/dL    Bilirubin NEGATIVE  NEG      Blood NEGATIVE  NEG      Urobilinogen 0.2 0.2 - 1.0 EU/dL    Nitrites NEGATIVE  NEG      Leukocyte Esterase NEGATIVE  NEG     DRUG SCREEN, URINE    Collection Time: 02/12/17  1:04 PM   Result Value Ref Range    BENZODIAZEPINE NEGATIVE  NEG      BARBITURATES NEGATIVE  NEG      THC (TH-CANNABINOL) NEGATIVE  NEG      OPIATES NEGATIVE  NEG      PCP(PHENCYCLIDINE) NEGATIVE  NEG      COCAINE NEGATIVE  NEG      AMPHETAMINE NEGATIVE  NEG      METHADONE NEGATIVE       HDSCOM (NOTE)    EKG, 12 LEAD, INITIAL    Collection Time: 02/12/17  1:16 PM   Result Value Ref Range    Ventricular Rate 87 BPM    Atrial Rate 87 BPM    P-R Interval 158 ms    QRS Duration 102 ms    Q-T Interval 398 ms    QTC Calculation (Bezet) 478 ms    Calculated P Axis 44 degrees    Calculated R Axis 41 degrees    Calculated T Axis 24 degrees    Diagnosis       Normal sinus rhythm  Normal ECG  No previous ECGs available     CBC WITH AUTOMATED DIFF    Collection Time: 02/12/17  1:29 PM   Result Value Ref Range    WBC 8.2 4.6 - 13.2 K/uL    RBC 3.91 (L) 4.70 - 5.50 M/uL    HGB 13.4 13.0 - 16.0 g/dL    HCT 38.9 36.0 - 48.0 %    MCV 99.5 (H) 74.0 - 97.0 FL    MCH 34.3 (H) 24.0 - 34.0 PG    MCHC 34.4 31.0 - 37.0 g/dL    RDW 12.5 11.6 - 14.5 %    PLATELET 274 409 - 235 K/uL    MPV 10.9 9.2 - 11.8 FL    NEUTROPHILS 61 40 - 73 %    LYMPHOCYTES 25 21 - 52 %    MONOCYTES 13 (H) 3 - 10 %    EOSINOPHILS 0 0 - 5 %    BASOPHILS 1 0 - 2 %    ABS. NEUTROPHILS 5.1 1.8 - 8.0 K/UL    ABS. LYMPHOCYTES 2.0 0.9 - 3.6 K/UL    ABS. MONOCYTES 1.1 0.05 - 1.2 K/UL    ABS. EOSINOPHILS 0.0 0.0 - 0.4 K/UL    ABS.  BASOPHILS 0.0 0.0 - 0.06 K/UL    DF AUTOMATED     METABOLIC PANEL, BASIC    Collection Time: 02/12/17  1:29 PM   Result Value Ref Range    Sodium 136 136 - 145 mmol/L    Potassium 3.7 3.5 - 5.5 mmol/L    Chloride 103 100 - 108 mmol/L    CO2 23 21 - 32 mmol/L    Anion gap 10 3.0 - 18 mmol/L    Glucose 88 74 - 99 mg/dL    BUN 9 7.0 - 18 MG/DL    Creatinine 0.74 0.6 - 1.3 MG/DL    BUN/Creatinine ratio 12 12 - 20      GFR est AA >60 >60 ml/min/1.73m2    GFR est non-AA >60 >60 ml/min/1.73m2    Calcium 8.7 8.5 - 10.1 MG/DL   HEPATIC FUNCTION PANEL    Collection Time: 02/12/17  1:29 PM   Result Value Ref Range    Protein, total 7.8 6.4 - 8.2 g/dL    Albumin 4.9 3.4 - 5.0 g/dL    Globulin 2.9 2.0 - 4.0 g/dL    A-G Ratio 1.7 0.8 - 1.7      Bilirubin, total 0.9 0.2 - 1.0 MG/DL    Bilirubin, direct 0.3 (H) 0.0 - 0.2 MG/DL    Alk. phosphatase 40 (L) 45 - 117 U/L    AST (SGOT) 123 (H) 15 - 37 U/L    ALT (SGPT) 146 (H) 16 - 61 U/L   CARDIAC PANEL,(CK, CKMB & TROPONIN)    Collection Time: 02/12/17  1:29 PM   Result Value Ref Range     (H) 39 - 308 U/L    CK - MB 2.2 0.5 - 3.6 ng/ml    CK-MB Index 0.6 0.0 - 4.0 %    Troponin-I, Qt. <0.02 0.0 - 0.045 NG/ML   ETHYL ALCOHOL    Collection Time: 02/12/17  1:29 PM   Result Value Ref Range    ALCOHOL(ETHYL),SERUM <3 0 - 3 MG/DL   MAGNESIUM    Collection Time: 02/12/17  1:29 PM   Result Value Ref Range    Magnesium 2.0 1.8 - 2.4 mg/dL   PHOSPHORUS    Collection Time: 02/12/17  1:29 PM   Result Value Ref Range    Phosphorus 3.0 2.5 - 4.9 MG/DL       EKG interpretation by ED Physician:  1316, rate 87, normal axis, NSR, normal intervals, no ST changes    Progress notes, Consult notes or additional Procedure notes:   2:17 PM Bran Parra DO discussed care with Dr. Aide Soto. Standard discussion; including history of patients chief complaint, available diagnostic results, and treatment course. Dr. Aide Soto agrees to admit the pt      Reevaluation of patient:   Discussed results with pt with assistance of Padilla Otero RN, . Pt presently denies any hallucinations, although admits to seeing bugs yesterday. Disposition:  Diagnosis:   1.  Alcohol withdrawal, with perceptual disturbance ([address]        Disposition: admit      [address] acting as a scribe for and in the presence of Eleni Hwang DO  February 12, 2017 at 2:18 PM       Signed By: Eddi Gunn, February 12, 2017 at 2:18 PM       Provider Attestation:  I personally performed the services described in the documentation, reviewed the documentation, as recorded by the scribe in my presence, and it accurately and completely records my words and actions.   Eleni Hwang DO

## 2017-02-13 LAB
ALBUMIN SERPL BCP-MCNC: 4.3 G/DL (ref 3.4–5)
ALBUMIN/GLOB SERPL: 1.6 {RATIO} (ref 0.8–1.7)
ALP SERPL-CCNC: 37 U/L (ref 45–117)
ALT SERPL-CCNC: 214 U/L (ref 16–61)
ANION GAP BLD CALC-SCNC: 8 MMOL/L (ref 3–18)
AST SERPL W P-5'-P-CCNC: 176 U/L (ref 15–37)
ATRIAL RATE: 87 BPM
BASOPHILS # BLD AUTO: 0 K/UL (ref 0–0.06)
BASOPHILS # BLD: 1 % (ref 0–2)
BILIRUB SERPL-MCNC: 1.4 MG/DL (ref 0.2–1)
BUN SERPL-MCNC: 7 MG/DL (ref 7–18)
BUN/CREAT SERPL: 12 (ref 12–20)
CALCIUM SERPL-MCNC: 8.8 MG/DL (ref 8.5–10.1)
CALCULATED P AXIS, ECG09: 44 DEGREES
CALCULATED R AXIS, ECG10: 41 DEGREES
CALCULATED T AXIS, ECG11: 24 DEGREES
CHLORIDE SERPL-SCNC: 107 MMOL/L (ref 100–108)
CO2 SERPL-SCNC: 26 MMOL/L (ref 21–32)
CREAT SERPL-MCNC: 0.6 MG/DL (ref 0.6–1.3)
DIAGNOSIS, 93000: NORMAL
DIFFERENTIAL METHOD BLD: ABNORMAL
EOSINOPHIL # BLD: 0.2 K/UL (ref 0–0.4)
EOSINOPHIL NFR BLD: 5 % (ref 0–5)
ERYTHROCYTE [DISTWIDTH] IN BLOOD BY AUTOMATED COUNT: 12.6 % (ref 11.6–14.5)
GLOBULIN SER CALC-MCNC: 2.7 G/DL (ref 2–4)
GLUCOSE SERPL-MCNC: 81 MG/DL (ref 74–99)
HCT VFR BLD AUTO: 40.5 % (ref 36–48)
HGB BLD-MCNC: 13.6 G/DL (ref 13–16)
LYMPHOCYTES # BLD AUTO: 37 % (ref 21–52)
LYMPHOCYTES # BLD: 1.8 K/UL (ref 0.9–3.6)
MCH RBC QN AUTO: 34.2 PG (ref 24–34)
MCHC RBC AUTO-ENTMCNC: 33.6 G/DL (ref 31–37)
MCV RBC AUTO: 101.8 FL (ref 74–97)
MONOCYTES # BLD: 0.8 K/UL (ref 0.05–1.2)
MONOCYTES NFR BLD AUTO: 16 % (ref 3–10)
NEUTS SEG # BLD: 2 K/UL (ref 1.8–8)
NEUTS SEG NFR BLD AUTO: 41 % (ref 40–73)
P-R INTERVAL, ECG05: 158 MS
PLATELET # BLD AUTO: 154 K/UL (ref 135–420)
PMV BLD AUTO: 11.1 FL (ref 9.2–11.8)
POTASSIUM SERPL-SCNC: 3.7 MMOL/L (ref 3.5–5.5)
PROT SERPL-MCNC: 7 G/DL (ref 6.4–8.2)
Q-T INTERVAL, ECG07: 398 MS
QRS DURATION, ECG06: 102 MS
QTC CALCULATION (BEZET), ECG08: 478 MS
RBC # BLD AUTO: 3.98 M/UL (ref 4.7–5.5)
SODIUM SERPL-SCNC: 141 MMOL/L (ref 136–145)
VENTRICULAR RATE, ECG03: 87 BPM
WBC # BLD AUTO: 4.8 K/UL (ref 4.6–13.2)

## 2017-02-13 PROCEDURE — 97161 PT EVAL LOW COMPLEX 20 MIN: CPT

## 2017-02-13 PROCEDURE — C9113 INJ PANTOPRAZOLE SODIUM, VIA: HCPCS | Performed by: INTERNAL MEDICINE

## 2017-02-13 PROCEDURE — 74011250637 HC RX REV CODE- 250/637: Performed by: INTERNAL MEDICINE

## 2017-02-13 PROCEDURE — 80053 COMPREHEN METABOLIC PANEL: CPT | Performed by: INTERNAL MEDICINE

## 2017-02-13 PROCEDURE — 74011000250 HC RX REV CODE- 250: Performed by: INTERNAL MEDICINE

## 2017-02-13 PROCEDURE — 65270000029 HC RM PRIVATE

## 2017-02-13 PROCEDURE — 74011250636 HC RX REV CODE- 250/636: Performed by: INTERNAL MEDICINE

## 2017-02-13 PROCEDURE — 77030020263 HC SOL INJ SOD CL0.9% LFCR 1000ML

## 2017-02-13 PROCEDURE — 85025 COMPLETE CBC W/AUTO DIFF WBC: CPT | Performed by: INTERNAL MEDICINE

## 2017-02-13 RX ORDER — SODIUM CHLORIDE 0.9 % (FLUSH) 0.9 %
5-10 SYRINGE (ML) INJECTION AS NEEDED
Status: DISCONTINUED | OUTPATIENT
Start: 2017-02-13 | End: 2017-02-13

## 2017-02-13 RX ORDER — LORAZEPAM 2 MG/ML
1 INJECTION INTRAMUSCULAR
Status: DISCONTINUED | OUTPATIENT
Start: 2017-02-13 | End: 2017-02-14 | Stop reason: HOSPADM

## 2017-02-13 RX ORDER — LORAZEPAM 1 MG/1
1 TABLET ORAL
Status: DISCONTINUED | OUTPATIENT
Start: 2017-02-13 | End: 2017-02-14 | Stop reason: HOSPADM

## 2017-02-13 RX ORDER — ONDANSETRON 2 MG/ML
4 INJECTION INTRAMUSCULAR; INTRAVENOUS
Status: DISCONTINUED | OUTPATIENT
Start: 2017-02-13 | End: 2017-02-14 | Stop reason: HOSPADM

## 2017-02-13 RX ORDER — ASPIRIN 325 MG/1
100 TABLET, FILM COATED ORAL DAILY
Status: DISCONTINUED | OUTPATIENT
Start: 2017-02-13 | End: 2017-02-14 | Stop reason: HOSPADM

## 2017-02-13 RX ORDER — SODIUM CHLORIDE 9 MG/ML
100 INJECTION, SOLUTION INTRAVENOUS CONTINUOUS
Status: DISCONTINUED | OUTPATIENT
Start: 2017-02-13 | End: 2017-02-14 | Stop reason: HOSPADM

## 2017-02-13 RX ORDER — LORAZEPAM 1 MG/1
2 TABLET ORAL
Status: DISCONTINUED | OUTPATIENT
Start: 2017-02-13 | End: 2017-02-14 | Stop reason: HOSPADM

## 2017-02-13 RX ORDER — LORAZEPAM 2 MG/ML
2 INJECTION INTRAMUSCULAR
Status: DISCONTINUED | OUTPATIENT
Start: 2017-02-13 | End: 2017-02-14 | Stop reason: HOSPADM

## 2017-02-13 RX ORDER — LORAZEPAM 1 MG/1
2 TABLET ORAL EVERY 6 HOURS
Status: DISCONTINUED | OUTPATIENT
Start: 2017-02-13 | End: 2017-02-14

## 2017-02-13 RX ORDER — THERA TABS 400 MCG
1 TAB ORAL DAILY
Status: DISCONTINUED | OUTPATIENT
Start: 2017-02-13 | End: 2017-02-14 | Stop reason: HOSPADM

## 2017-02-13 RX ORDER — LORAZEPAM 2 MG/ML
3 INJECTION INTRAMUSCULAR
Status: DISCONTINUED | OUTPATIENT
Start: 2017-02-13 | End: 2017-02-14 | Stop reason: HOSPADM

## 2017-02-13 RX ORDER — SODIUM CHLORIDE 0.9 % (FLUSH) 0.9 %
5-10 SYRINGE (ML) INJECTION EVERY 8 HOURS
Status: DISCONTINUED | OUTPATIENT
Start: 2017-02-13 | End: 2017-02-13

## 2017-02-13 RX ORDER — FOLIC ACID 1 MG/1
1 TABLET ORAL DAILY
Status: DISCONTINUED | OUTPATIENT
Start: 2017-02-13 | End: 2017-02-14 | Stop reason: HOSPADM

## 2017-02-13 RX ADMIN — SODIUM CHLORIDE 100 ML/HR: 900 INJECTION, SOLUTION INTRAVENOUS at 19:49

## 2017-02-13 RX ADMIN — LORAZEPAM 2 MG: 1 TABLET ORAL at 08:00

## 2017-02-13 RX ADMIN — Medication 100 MG: at 09:08

## 2017-02-13 RX ADMIN — FOLIC ACID 1 MG: 1 TABLET ORAL at 09:08

## 2017-02-13 RX ADMIN — LORAZEPAM 2 MG: 1 TABLET ORAL at 15:21

## 2017-02-13 RX ADMIN — THERA TABS 1 TABLET: TAB at 09:08

## 2017-02-13 RX ADMIN — SODIUM CHLORIDE 100 ML/HR: 900 INJECTION, SOLUTION INTRAVENOUS at 07:53

## 2017-02-13 RX ADMIN — SODIUM CHLORIDE 40 MG: 9 INJECTION INTRAMUSCULAR; INTRAVENOUS; SUBCUTANEOUS at 09:08

## 2017-02-13 NOTE — ED NOTES
Purposeful rounding completed:    Side rails up x 2:  YES  Bed low and wheels and locked: YES  Call bell in reach: YES  Comfort addressed: YES     Toileting needs addressed: YES  Plan of care reviewed/updated with patient and or family members: YES  IV site assessed: YES  Pain assessed and addressed: pt resting comfortably on the stretcher with eyes closed and in no distress.   Sitter at bedside

## 2017-02-13 NOTE — ED NOTES
Purposeful rounding completed:    Side rails up x 2:  YES  Bed in low position and wheels locked: YES  Call bell within reach: YES  Comfort addressed: YES    Toileting needs addressed: YES  Plan of care reviewed/updated with patient and or family members: YES  IV site assessed: YES  Pain assessed and addressed: YES, 0    Patient was asleep upon entering room to complete CIWA scale.  Patient awakened to assess and went back to sleep

## 2017-02-13 NOTE — ED NOTES
Purposeful rounding completed:    Side rails up x 2:  YES  Bed low and wheels and locked: YES  Call bell in reach: YES  Comfort addressed: YES     Toileting needs addressed: YES  Plan of care reviewed/updated with patient and or family members: YES  IV site assessed: YES  Pain assessed and addressed: Pt resting comfortably on the stretcher in no distress.  Sitter at the bedside.

## 2017-02-13 NOTE — ED NOTES
Purposeful rounding completed:    Side rails up x 2:  YES  Bed in low position and wheels locked: YES  Call bell within reach: YES  Comfort addressed: YES    Toileting needs addressed: YES  Plan of care reviewed/updated with patient and or family members: YES  IV site assessed: YES  Pain assessed and addressed: YES, 0    Family at bedside. PAtient states hes comfortable and does not need anything at this time. PAtient awake and alert in bed.

## 2017-02-13 NOTE — ED NOTES
Purposeful rounding completed:    Side rails up x 2:  YES  Bed in low position and wheels locked: YES  Call bell within reach: YES  Comfort addressed: YES    Toileting needs addressed: YES  Plan of care reviewed/updated with patient and or family members: YES  IV site assessed: YES  Pain assessed and addressed: YES, 0  PAtient medicated with ativan at this time. Patient sitting up in bed talking to family.  I verbalized to patient that his bed assignment was false and he will be kept down here at this time and patient verbalized understanding

## 2017-02-13 NOTE — ED NOTES
Received care of patient from [address]. Pt restless, and shaking on the stretcher indicating the need for the bathroom. Sitter at the bedside who assisted with the urinal.   Soft wrist restraints in place. Circulation present, no injury noted.

## 2017-02-13 NOTE — ED NOTES
Purposeful rounding completed:    Side rails up x 2:  YES  Bed low and wheels and locked: YES  Call bell in reach: YES  Comfort addressed: YES     Toileting needs addressed: YES  Plan of care reviewed/updated with patient and or family members: YES  IV site assessed: YES  Pain assessed and addressed: rounded on patient who is resting comfortably on the stretcher in no apparent distress.  Mother and sitter at the bedside.

## 2017-02-13 NOTE — ED NOTES
Purposeful rounding completed:     Side rails up x 2:  YES  Bed low and wheels and locked: YES  Call bell in reach: YES  Comfort addressed: YES     Toileting needs addressed: YES  Plan of care reviewed/updated with patient and or family members: YES  IV site assessed: YES  Pain assessed and addressed: resting comfortably on the stretcher in no apparent distress.  Will continue to monitor.

## 2017-02-13 NOTE — ED NOTES
Purposeful rounding completed:    Side rails up x 1:  YES  Bed in low position and wheels locked: YES  Call bell within reach: YES  Comfort addressed: YES    Toileting needs addressed: YES  Plan of care reviewed/updated with patient and or family members: YES  IV site assessed: YES  Pain assessed and addressed: YES, 0    Patient sitting on edge of bed taking with family member

## 2017-02-13 NOTE — PROGRESS NOTES
Problem: Mobility Impaired (Adult and Pediatric)  Goal: *Acute Goals and Plan of Care (Insert Text)  Outcome: Resolved/Met Date Met:  02/13/17  PHYSICAL THERAPY EVALUATION & DISCHARGE     Patient: Julisa Martino (89 y.o. male)  Date: 2/13/2017  Primary Diagnosis: Alcohol withdrawal ([facility]        Precautions: falls, tremors         ASSESSMENT AND RECOMMENDATIONS:  Based on the objective data described below, the patient presents at independent level with transfers and SBA with standing activities and ambulation due to tremors in extremities. Pt will require SBA with all mobility for safety until his tremors have passed, nursing and family were educated on this. Skilled physical therapy is not indicated at this time. Discharge Recommendations: None  Further Equipment Recommendations for Discharge: N/A        G-CODES:      Mobility  Current  CI= 1-19%   Goal  CI= 1-19%  D/C  CI= 1-19%. The severity rating is based on the Level of Assistance required for Functional Mobility and ADLs. SUBJECTIVE:   Patient stated I feel good.       OBJECTIVE DATA SUMMARY:       Past Medical History   Diagnosis Date    Alcoholic ([facility]     History reviewed. No pertinent past surgical history. Barriers to Learning/Limitations: yes; Language- family interpreted during evaluation  Compensate with: visual, verbal, tactile, kinesthetic cues/model  Prior Level of Function/Home Situation: independent with mobility without assistive device     Critical Behavior:   calm and cooperative   Strength:    Strength:  Within functional limits   Tone & Sensation:   Tone: Abnormal (tremoring in upper and lower extremities)   Range Of Motion:  AROM: Within functional limits   PROM: Within functional limits   Functional Mobility:  Bed Mobility:  Rolling: Independent  Supine to Sit: Independent  Sit to Supine: Independent     Transfers:  Sit to Stand: Stand-by asssistance  Stand to Sit: Stand-by asssistance  Balance: Sitting: Intact  Standing: Without support  Standing - Static: Good  Standing - Dynamic : Fair  Ambulation/Gait Training:  Distance (ft): 12 Feet (ft)  Assistive Device:  (none)  Ambulation - Level of Assistance: Stand-by asssistance  Gait Abnormalities: Decreased step clearance  Pain:  Pt reports 0/10 pain or discomfort prior to treatment. Pt reports 0/10 pain or discomfort post treatment. Activity Tolerance:   good  Please refer to the flowsheet for vital signs taken during this treatment. After treatment:   [ ]         Patient left in no apparent distress sitting up in chair  [X]         Patient left in no apparent distress in bed  [X]         Call bell left within reach  [X]         Nursing notified  [X]         Caregiver present  [ ]         Bed alarm activated      COMMUNICATION/EDUCATION:   [X]         Fall prevention education was provided and the patient/caregiver indicated understanding. [X]         Patient/family have participated as able in goal setting and plan of care.-eval only  [ ]         Patient/family agree to work toward stated goals and plan of care. [ ]         Patient understands intent and goals of therapy, but is neutral about his/her participation. [ ]         Patient is unable to participate in goal setting and plan of care.   Educated patient on calling for assistance to get up     Thank you for this referral.  Ger Lara, PT   Time Calculation: 15 mins

## 2017-02-13 NOTE — ED NOTES
Assumed care of pt at 0730, pt was sleeping, easily wakes to nurses voice, pt noted to have tremors to hands, denies any other symptoms, aunt at bedside.

## 2017-02-13 NOTE — ED NOTES
Received bedside patient shift report from Jolene Vizcarra., AUGUSTO. Assuming care of patient at this time. PAtient no longer in restraints when I assumed care of patient. Family at bedside.

## 2017-02-13 NOTE — PROGRESS NOTES
attempted to complete  the initial Spiritual Assessment of the patient in bed 12 of the emergency room this morning at 0825, and offer Pastoral Care support but found patient resting peacefully in the bed and not able to communicate this morning. Spoke with a family member who was in the room. Patient does not have any Adventism/cultural needs that will affect patients preferences in health care.    Chaplains will continue to follow and will provide pastoral care on an as needed/requested basis     Chaplain Kasi Louie   Board Certified [address] [place] [address]   [phone]

## 2017-02-13 NOTE — ED NOTES
Bedside and Verbal shift change report given to [address] (oncoming nurse) by Ramon Harris RN   (offgoing nurse). Report included the following information SBAR, Kardex and ED Summary.

## 2017-02-13 NOTE — ED NOTES
Purposeful rounding completed:    Side rails up x 2:  YES  Bed in low position and wheels locked: YES  Call bell within reach: YES  Comfort addressed: YES    Toileting needs addressed: YES  Plan of care reviewed/updated with patient and or family members: YES  IV site assessed: YES  Pain assessed and addressed: YES, 0    Patient verbalized understanding that he ahs a bed upstairs and will be being transferred upstairs shortly.  PAtient awake in bed at this time and family returned to bedside

## 2017-02-13 NOTE — ED NOTES
Purposeful rounding completed:    Side rails up x 1:  YES  Bed in low position and wheels locked: YES  Call bell within reach: YES  Comfort addressed: YES    Toileting needs addressed: YES  Plan of care reviewed/updated with patient and or family members: YES  IV site assessed: YES  Pain assessed and addressed: YES, 0    PAtient was sleeping at bedside ans woken up for CIWA scale assessment.  Patient returning to sleep at this time

## 2017-02-13 NOTE — ED NOTES
Rounded on patient who is resting comfortably on the stretcher in no apparent distress. Pt no longer pulling at lines. Sitter at the bedside.

## 2017-02-13 NOTE — ED NOTES
Pt continues to attempt to sit up and pull at IV lines- restraints continued. Valium given per MD order. Sitter at bedside. VSS, pt unable to follow commands at this time.

## 2017-02-13 NOTE — ED NOTES
Purposeful rounding completed:    Side rails up x 1:  YES  Bed in low position and wheels locked: YES  Call bell within reach: YES  Comfort addressed: YES    Toileting needs addressed: YES  Plan of care reviewed/updated with patient and or family members: YES  IV site assessed: YES  Pain assessed and addressed: YES, 0    Patient sitting on edge of bed talking with family member at this time    Patients IV site is red but it flushes and pulls back. I will place new IV at this time and start fluids on this side instead of the present IV but leave it in place because no signs of IV infiltrating or blowing at this time in case we need extra access.

## 2017-02-13 NOTE — PROGRESS NOTES
[facility]/\[facility]\[address]   Discharge Planning/ Assessment    Reasons for Intervention: Lacho Chisholm pt with his cousin, Shanna Goldberg [phone]. Pt doesn't speak English. Pt designates his cousin to participate in discharge plan and receive information. Pt is independent with ADLs prior to admission. Pt has no insurance and no PCP. Demographics verified and correct. Discharge plan is to return home.     High Risk Criteria  [x] Yes  []No   Physician Referral  [] Yes  [x]No        Date    Nursing Referral  [] Yes  [x]No        Date    Patient/Family Request  [] Yes  [x]No        Date       Resources:    Medicare  [] Yes  [x]No   Medicaid  [] Yes  [x]No   No Resources  [x] Yes  []No   Private Insurance  [] Yes  [x]No    Name/Phone Number    Other  [] Yes  [x]No        (i.e. Workman's Comp)         Prior Services:    Prior Services  [] Yes  [x]No   Home Health  [] Yes  [x]No   [address]  [] Yes  [x]No        Number of [address] Program  [] Yes  [x]No       Meals on Wheels  [] Yes  [x]No   Office on Aging  [] Yes  [x]No   Transportation Services  [] Yes  [x]No   Nursing Home  [] Yes  [x]No        Nursing Home Name    [address] [facility]  [] Yes  [x]No        [address] Name    Other       Information Source:      Information obtained from  [] Patient  [] Parent   [] [address]  [] Child  [] Spouse   [] Significant Other/Partner   [] Friend      [] EMS    [] Nursing Home Chart          [x] Other:cousin   Chart Review  [x] Yes  []No     Family/Support System:    Patient lives with  [x] Alone    [] Spouse   [] Significant Other  [] Children  [] Caretaker   [] Parent  [] Sibling     [] Other       Other Support System:    Is the patient responsible for care of others  [] Yes  []No   Information of person caring for patient on  discharge    Managers financial affairs independently  [] Yes  []No   If no, explain:      Status Prior to Admission:    Mental Status  [x] Awake  [x] Alert  [x] Oriented  [] Quiet/Calm [] Lethargic/Sedated   [] Disoriented  [] Restless/Anxious  [] Combative   Personal Care  [] Dependent  [x] Independent Personal Care  [] Requires Assistance   Meal Preparation Ability  [x] Independent   [] Standby Assistance   [] Minimal Assistance   [] Moderate Assistance  [] Maximum Assistance     [] Total Assistance   Chores  [x] Independent with Chores   [] N/A Nursing Home Resident   [] Requires Assistance   Bowel/Bladder  [x] Continent  [] Catheter  [] Incontinent  [] Ostomy Self-Care    [] Urine Diversion Self-Care  [] Maximum Assistance     [] Total Assistance   Number of Persons needed for assistance    DME at home  [] Elle Sandoval Bending  [] Stephanie Haque Straight   [] Commode    [] Bathroom/Grab Bars  [] Hospital Bed  [] Nebulizer  [] Oxygen           [] Raised Toilet Seat  [] Shower Chair  [] Side Rails for Bed   [] Tub Transfer Bench   [] Kalli Danie  [] Colonel Dexter, Standard      [] Other:   Vendor      Treatment Presently Receiving:    Current Treatments  [] Chemotherapy  [] Dialysis  [] Insulin  [] IVAB [x] IVF   [] O2  [] PCA   [] PT   [] RT   [] Tube Feedings   [] Wound Care     Psychosocial Evaluation:    Verbalized Knowledge of Disease Process  [] Patient  []Family   Coping with Disease Process  [] Patient  []Family   Requires Further Counseling Coping with Disease Process  [] Patient  []Family     Identified Projected Needs:    Home Health Aid  [] Yes  [x]No   Transportation  [] Yes  [x]No   Education  [] Yes  [x]No        Specific Education     Financial Counseling  [] Yes  [x]No   Inability to Care for Self/Will Require 24 hour care  [] Yes  [x]No   Pain Management  [] Yes  [x]No   Home Infusion Therapy  [] Yes  [x]No   Oxygen Therapy  [] Yes  [x]No   DME  [] Yes  [x]No   Long Term Care Placement  [] Yes  [x]No   Rehab  [] Yes  [x]No   Physical Therapy  [] Yes  [x]No   Needs Anticipated At This Time  [] Yes  [x]No     Intra-Hospital Referral:    [facility] [] Yes  [x]No     [x] Yes  []No   Patient Representative  [] Yes  [x]No   Staff for Teaching Needs  [] Yes  [x]No   Specialty Teaching Needs     Diabetic Educator  [] Yes  [x]No   Referral for Diabetic Educator Needed  [] Yes  [x]No  If Yes, place order for Nutritionist or Diabetic Consult     Tentative Discharge Plan:    Home with No Services  [x] Yes  []No   Home with [address]  [] Yes  [x]No        If Yes, specify type    Home Care Program  [] Yes  [x]No        If Yes, specify type    Meals on Wheels  [] Yes  [x]No   Office of Aging  [] Yes  [x]No   NHP  [] Yes  [x]No   Return to the Nursing Home  [] Yes  [x]No   Rehab Therapy  [] Yes  [x]No   Acute Rehab  [] Yes  [x]No   Subacute Rehab  [] Yes  [x]No   Private Care  [] Yes  [x]No   Substance Abuse Referral  [] Yes  [x]No   Transportation  [] Yes  [x]No   Chore Service  [] Yes  [x]No   Inpatient Hospice  [] Yes  [x]No   OP RT  [] Yes  [x] No   OP Hemo  [] Yes  [x] No   OP PT  [] Yes  [x]No   Support Group  [x] Yes  []No   Reach to Recovery  [x] Yes  []No   OP Oncology Clinic  [] Yes  [x]No   Clinic Appointment  [] Yes  [x]No   DME  [] Yes  [x]No   Comments    Name of D/C Planner or  Given to Patient or Jennifer Mccann   Phone Number [phone]        Extension    Date 2/13/17   Time [phone]   If you are discharged home, whom do you designate to participate in your discharge plan and receive any information needed?      Enter name of Adriana linton        Phone # of Andrew Corbin [phone]        Address of designee         Updated 2/13/17        Patient refused to designate any           individual

## 2017-02-13 NOTE — ED NOTES
Entered room to medicate pt, pt continues to sleep without distress noted. Aunt at bedside. Pt hourly rounding competed. Safety   Pt (x) resting on stretcher with side rails up and call bell in reach. () in chair    () in parents arms. Toileting   Pt offered ()Bedpan     ()Assistance to Restroom     ()Urinal  Ongoing Updates  Updated on plan of care and status of test results.   Pain Management  Inquired as to comfort and offered comfort measures:    () warm blankets   () dimmed lights

## 2017-02-13 NOTE — ED NOTES
Purposeful rounding completed:    Side rails up x 2:  YES  Bed low and wheels and locked: YES  Call bell in reach: YES  Comfort addressed: YES     Toileting needs addressed: YES  Plan of care reviewed/updated with patient and or family members: YES  IV site assessed: YES  Pain assessed and addressed: Pt resting comfortably on the stretcher in no apparent distress.

## 2017-02-14 VITALS
OXYGEN SATURATION: 99 % | HEIGHT: 64 IN | WEIGHT: 180 LBS | SYSTOLIC BLOOD PRESSURE: 151 MMHG | DIASTOLIC BLOOD PRESSURE: 88 MMHG | TEMPERATURE: 98.3 F | RESPIRATION RATE: 14 BRPM | BODY MASS INDEX: 30.73 KG/M2 | HEART RATE: 75 BPM

## 2017-02-14 PROCEDURE — 74011250636 HC RX REV CODE- 250/636: Performed by: INTERNAL MEDICINE

## 2017-02-14 PROCEDURE — 74011000250 HC RX REV CODE- 250: Performed by: INTERNAL MEDICINE

## 2017-02-14 PROCEDURE — 90471 IMMUNIZATION ADMIN: CPT

## 2017-02-14 PROCEDURE — 3E0234Z INTRODUCTION OF SERUM, TOXOID AND VACCINE INTO MUSCLE, PERCUTANEOUS APPROACH: ICD-10-PCS | Performed by: HOSPITALIST

## 2017-02-14 PROCEDURE — 74011250637 HC RX REV CODE- 250/637: Performed by: INTERNAL MEDICINE

## 2017-02-14 PROCEDURE — C9113 INJ PANTOPRAZOLE SODIUM, VIA: HCPCS | Performed by: INTERNAL MEDICINE

## 2017-02-14 PROCEDURE — 77030020263 HC SOL INJ SOD CL0.9% LFCR 1000ML

## 2017-02-14 PROCEDURE — 74011250637 HC RX REV CODE- 250/637: Performed by: HOSPITALIST

## 2017-02-14 PROCEDURE — 90686 IIV4 VACC NO PRSV 0.5 ML IM: CPT | Performed by: INTERNAL MEDICINE

## 2017-02-14 RX ORDER — DIAZEPAM 5 MG/1
TABLET ORAL
Qty: 6 TAB | Refills: 0 | Status: SHIPPED | OUTPATIENT
Start: 2017-02-14 | End: 2017-02-16 | Stop reason: SDUPTHER

## 2017-02-14 RX ORDER — DIAZEPAM 2 MG/1
5 TABLET ORAL EVERY 6 HOURS
Status: DISCONTINUED | OUTPATIENT
Start: 2017-02-14 | End: 2017-02-14 | Stop reason: HOSPADM

## 2017-02-14 RX ADMIN — FOLIC ACID 1 MG: 1 TABLET ORAL at 09:37

## 2017-02-14 RX ADMIN — SODIUM CHLORIDE 40 MG: 9 INJECTION INTRAMUSCULAR; INTRAVENOUS; SUBCUTANEOUS at 09:37

## 2017-02-14 RX ADMIN — THERA TABS 1 TABLET: TAB at 09:37

## 2017-02-14 RX ADMIN — LORAZEPAM 2 MG: 1 TABLET ORAL at 05:05

## 2017-02-14 RX ADMIN — SODIUM CHLORIDE 100 ML/HR: 900 INJECTION, SOLUTION INTRAVENOUS at 04:40

## 2017-02-14 RX ADMIN — INFLUENZA A VIRUS A/CALIFORNIA/7/2009 X-179A (H1N1) ANTIGEN (FORMALDEHYDE INACTIVATED), INFLUENZA A VIRUS A/HONG KONG/4801/2014 X-263B (H3N2) ANTIGEN (FORMALDEHYDE INACTIVATED), INFLUENZA B VIRUS B/PHUKET/3073/2013 ANTIGEN (FORMALDEHYDE INACTIVATED), AND INFLUENZA B VIRUS B/BRISBANE/60/2008 ANTIGEN (FORMALDEHYDE INACTIVATED) 0.5 ML: 15; 15; 15; 15 INJECTION, SUSPENSION INTRAMUSCULAR at 15:52

## 2017-02-14 RX ADMIN — DIAZEPAM 5 MG: 2 TABLET ORAL at 09:37

## 2017-02-14 RX ADMIN — Medication 100 MG: at 09:37

## 2017-02-14 RX ADMIN — DIAZEPAM 5 MG: 2 TABLET ORAL at 12:44

## 2017-02-14 NOTE — PROGRESS NOTES
Medicine Progress Note    Patient: Johnyn La   Age:  46 y.o.  DOA: 2/12/2017   Admit Dx / CC: Alcohol withdrawal ([address]  LOS:  LOS: 2 days     Assessment/Plan   Principal Problem:    Alcohol withdrawal ([address] (2/12/2017)    Active Problems:    Elevated liver enzymes (2/12/2017)      Hallucinations (2/12/2017)        Additional Plan notes       1)  Alcohol withdrawal- continue CIWA. DISPO     Anticipated Date of Discharge: 2/15  Anticipated Disposition (home, SNF) : home    Subjective:   Patient seen and examined. No complaints , having fine tremors    Objective:     Visit Vitals    /88 (BP 1 Location: Left arm, BP Patient Position: At rest)    Pulse 75    Temp 98.3 °F (36.8 °C)    Resp 14    Ht 5' 4\" (1.626 m)    Wt 81.6 kg (180 lb)    SpO2 97%    BMI 30.9 kg/m2       Physical Exam:  General appearance: alert, cooperative, no distress, appears stated age  Head: Normocephalic, without obvious abnormality, atraumatic  Neck: supple, trachea midline  Lungs: clear to auscultation bilaterally  Heart: regular rate and rhythm, S1, S2 normal, no murmur, click, rub or gallop  Abdomen: soft, non-tender.  Bowel sounds normal. No masses,  no organomegaly  Extremities: extremities normal, atraumatic, no cyanosis or edema  Skin: Skin color, texture, turgor normal. No rashes or lesions  Neurologic: Grossly normal  PSY: Mood and affect normal, appropriately behaved    Intake and Output:  Current Shift:     Last three shifts:  02/12 1901 - 02/14 0700  In: -   Out: 300 [Urine:300]    Lab/Data Reviewed:  CMP: No results found for: NA, K, CL, CO2, AGAP, GLU, BUN, CREA, GFRAA, GFRNA, CA, MG, PHOS, ALB, TBIL, TP, ALB, GLOB, AGRAT, SGOT, ALT, GPT  CBC: No results found for: WBC, HGB, HGBEXT, HCT, HCTEXT, PLT, PLTEXT, HGBEXT, HCTEXT, PLTEXT  All Cardiac Markers in the last 24 hours: No results found for: CPK, CKMMB, CKMB, RCK3, CKMBT, CKNDX, CKND1, JULIAN, TROPT, TROIQ, ROSEMARY, TROPT, TNIPOC, BNP, BNPP    Medications Reviewed:  Current Facility-Administered Medications   Medication Dose Route Frequency    LORazepam (ATIVAN) tablet 1 mg  1 mg Oral Q1H PRN    Or    LORazepam (ATIVAN) injection 1 mg  1 mg IntraVENous Q1H PRN    LORazepam (ATIVAN) tablet 2 mg  2 mg Oral Q1H PRN    Or    LORazepam (ATIVAN) injection 2 mg  2 mg IntraVENous Q1H PRN    LORazepam (ATIVAN) injection 3 mg  3 mg IntraVENous Q15MIN PRN    LORazepam (ATIVAN) tablet 2 mg  2 mg Oral Y4Y    folic acid (FOLVITE) tablet 1 mg  1 mg Oral DAILY    Thiamine Mononitrate (B-1) tablet 100 mg  100 mg Oral DAILY    therapeutic multivitamin (THERAGRAN) tablet 1 Tab  1 Tab Oral DAILY    ondansetron (ZOFRAN) injection 4 mg  4 mg IntraVENous Q6H PRN    0.9% sodium chloride infusion  100 mL/hr IntraVENous CONTINUOUS    pantoprazole (PROTONIX) 40 mg in sodium chloride 0.9 % 10 mL injection  40 mg IntraVENous DAILY    influenza vaccine 2016-17 (36mos+)(PF) (FLUZONE/FLUARIX/FLULAVAL QUAD) injection 0.5 mL  0.5 mL IntraMUSCular PRIOR TO DISCHARGE    LORazepam (ATIVAN) injection 2 mg  2 mg IntraVENous See Admin Instructions       Lisa Yip MD    February 14, 2017

## 2017-02-14 NOTE — DISCHARGE INSTRUCTIONS
DISCHARGE SUMMARY from Nurse    The following personal items are in your possession at time of discharge:    Dental Appliances: None  Visual Aid: None     Home Medications: None  Jewelry: None                   PATIENT INSTRUCTIONS:    After general anesthesia or intravenous sedation, for 24 hours or while taking prescription Narcotics:  · Limit your activities  · Do not drive and operate hazardous machinery  · Do not make important personal or business decisions  · Do  not drink alcoholic beverages  · If you have not urinated within 8 hours after discharge, please contact your surgeon on call. Report the following to your surgeon:  · Excessive pain, swelling, redness or odor of or around the surgical area  · Temperature over 100.5  · Nausea and vomiting lasting longer than 4 hours or if unable to take medications  · Any signs of decreased circulation or nerve impairment to extremity: change in color, persistent  numbness, tingling, coldness or increase pain  · Any questions        What to do at Home:  Recommended activity: Activity as tolerated    If you experience any of the following symptoms listed in your discharge information, please follow up with your PCP. *  Please give a list of your current medications to your Primary Care Provider. *  Please update this list whenever your medications are discontinued, doses are      changed, or new medications (including over-the-counter products) are added. *  Please carry medication information at all times in case of emergency situations. These are general instructions for a healthy lifestyle:    No smoking/ No tobacco products/ Avoid exposure to second hand smoke    Surgeon General's Warning:  Quitting smoking now greatly reduces serious risk to your health.     Obesity, smoking, and sedentary lifestyle greatly increases your risk for illness    A healthy diet, regular physical exercise & weight monitoring are important for maintaining a healthy lifestyle    You may be retaining fluid if you have a history of heart failure or if you experience any of the following symptoms:  Weight gain of 3 pounds or more overnight or 5 pounds in a week, increased swelling in our hands or feet or shortness of breath while lying flat in bed. Please call your doctor as soon as you notice any of these symptoms; do not wait until your next office visit. Recognize signs and symptoms of STROKE:    F-face looks uneven    A-arms unable to move or move unevenly    S-speech slurred or non-existent    T-time-call 911 as soon as signs and symptoms begin-DO NOT go       Back to bed or wait to see if you get better-TIME IS BRAIN. Warning Signs of HEART ATTACK     Call 911 if you have these symptoms:   Chest discomfort. Most heart attacks involve discomfort in the center of the chest that lasts more than a few minutes, or that goes away and comes back. It can feel like uncomfortable pressure, squeezing, fullness, or pain.  Discomfort in other areas of the upper body. Symptoms can include pain or discomfort in one or both arms, the back, neck, jaw, or stomach.  Shortness of breath with or without chest discomfort.  Other signs may include breaking out in a cold sweat, nausea, or lightheadedness. Don't wait more than five minutes to call 911 - MINUTES MATTER! Fast action can save your life. Calling 911 is almost always the fastest way to get lifesaving treatment. Emergency Medical Services staff can begin treatment when they arrive -- up to an hour sooner than if someone gets to the hospital by car. The discharge information has been reviewed with the patient. The patient verbalized understanding. Discharge medications reviewed with the patient and appropriate educational materials and side effects teaching were provided. Patient armband removed and shredded  MyChart Activation    Thank you for requesting access to [facility].  Please follow the instructions below to securely access and download your online medical record. Inaika allows you to send messages to your doctor, view your test results, renew your prescriptions, schedule appointments, and more. How Do I Sign Up? 1. In your internet browser, go to [email or web address]  2. Click on the First Time User? Click Here link in the Sign In box. You will be redirect to the New Member Sign Up page. 3. Enter your Inaika Access Code exactly as it appears below. You will not need to use this code after youve completed the sign-up process. If you do not sign up before the expiration date, you must request a new code. Inaika Access Code: [email or web address]  Expires: 2017  1:40 PM (This is the date your Inaika access code will )    4. Enter the last four digits of your Social Security Number (xxxx) and Date of Birth (mm/dd/yyyy) as indicated and click Submit. You will be taken to the next sign-up page. 5. Create a Inaika ID. This will be your Inaika login ID and cannot be changed, so think of one that is secure and easy to remember. 6. Create a Inaika password. You can change your password at any time. 7. Enter your Password Reset Question and Answer. This can be used at a later time if you forget your password. 8. Enter your e-mail address. You will receive e-mail notification when new information is available in [address] 9. Click Sign Up. You can now view and download portions of your medical record. 10. Click the Download Summary menu link to download a portable copy of your medical information. Additional Information    If you have questions, please visit the Frequently Asked Questions section of the Inaika website at [email or web address] Remember, Inaika is NOT to be used for urgent needs. For medical emergencies, dial 911. Desintoxicación y síndrome de abstinencia del alcohol:  Instrucciones de cuidado - [ Alcohol Detoxification and Withdrawal: Care Instructions ]  Instrucciones de cuidado  Si usted nicanor alcohol de forma regular y [place] asia de haHCA Midwest Divisionte repentina, puede experimentar algunos problemas físicos y emocionales mientras el alcohol desaparece de huang cuerpo. La palabra desintoxicación significa eliminar el alcohol de huang cuerpo. A los problemas físicos y emocionales que podrían ocurrir larry la desintoxicación se les conoce jayden síndrome de abstinencia. Los síntomas del síndrome de abstinencia pueden ser alarmantes y peligrosos. Entre los síntomas leves se encuentran náuseas y vómito, sudoración, temblores y preocupación intensa. Entre los síntomas graves se encuentran confusión e irritabilidad, sentir cosas en huang cuerpo que no están allí, bolivar y oír cosas que no existen y tener temblores. Puede que hasta tenga convulsiones. Si dia síntomas se vuelven graves usted debe visitar a un [place] que beben grandes cantidades de alcohol no deberían intentar la desintoxicación en huang hogar. [address] a un síndrome grave de abstinencia del alcohol. Los síntomas del síndrome de abstinencia de alcohol pueden comenzar de 4 a 12 horas después de dejar de beber alcohol. También puede ser que estos síntomas demoren en presentarse varios días después de huang última bebida. Los síntomas del síndrome de abstinencia de alcohol pueden durar unos cuantos días. Dejar de beber alcohol es difícil. Bhumi, cuando haya eliminado el alcohol de huang cuerpo, usted podrá iniciar arsenio nueva etapa de huang marva, sara de la carga de la [place]. La atención de seguimiento es arsenio parte clave de huang tratamiento y seguridad. Asegúrese de hacer y acudir a todas las citas, y llame a huang médico si está teniendo problemas. También es arsenio buena idea saber los resultados de dia exámenes y mantener arsenio lista de los medicamentos que logan. ¿Cómo puede cuidarse en el hogar? · Antes de comenzar, hable con huang médico sobre huang plan de dejar de beber.  Asegúrese de ser completamente honesto con huang médico acerca de cuánto ha estado bebiendo. Huang médico sabrá si necesita desintoxicarse en un centro médico supervisado. · [place] los medicamentos exactamente según las indicaciones. Llame a huang médico si justin estar teniendo un problema con huang medicamento. · Asegúrese de que alguien en quien usted confía lo acompañe todo el tiempo. Stephany que dia amigos y familiares tomen turnos para quedarse con usted hasta que termine la desintoxicación. · Coloque arsenio lista de números de emergencia cerca del teléfono. Esta debe incluir los teléfonos de huang médico, la [facility], el hospital y la [place] de urgencias más cercanos, y vecinos que puedan ayudar si es necesario. · Asegúrese de eliminar todo el alcohol que tenga en la casa antes de comenzar. [place] incluye bebidas y medicamentos, alcohol isopropílico (de frotar) y ciertos saborizantes jayden el extracto de vainilla. · Manténgase alejado de dia \"compañeros de bebida\" mientras hace la desintoxicación. · Mantenga huang ambiente en calma. La [facility] y la [place], así jayden un lugar cómodo para sentarse o acostarse pueden facilitar el proceso. · Sofi muchos líquidos y coma refrigerios jayden frutas, queso y [place], y \"pretzels\". Los alimentos ricos en carbohidratos podrían ayudar a reducir la ansiedad por beber alcohol. · Trate de comprender que la desintoxicación será difícil. · Tenga en cuenta que las personas que lo vigilan larry la desintoxicación están ahí para ayudar. Explíqueles antes de comenzar que usted podría empezar a actuar jayden arsenio persona diferente hasta que termine la desintoxicación. · Piense en unirse a un casie de apoyo jayden Alcohólicos Anónimos. Compartir dia experiencias con [facility] que enfrentan problemas similares puede ayudarle a sentirse menos abrumado.   · Guarde los números para estas líneas telefónicas nacionales de ayuda para prevenir el suicidio: 2-990-602-TALK ([phone]) y [phone] ([phone]). Si usted o alguien que conoce habla acerca del suicidio o de sentirse desesperanzado, busque ayuda inmediatamente. ¿Cuándo debe pedir ayuda? Llame al 911 en cualquier momento que considere que necesita atención de [place]. Por ejemplo, llame si:  · Siente que no puede dejar de lastimarse a sí mismo o a otra persona. · Vomita muchas veces y no puede dejar de hacerlo. · Vomita joselo o algo parecido a granos de café molido. · Tiene problemas para respirar o respira muy rápido. · Weaver corazón late más de 120 veces por minuto y la frecuencia no disminuye. · Tiene dolor de pecho. · Tiene convulsiones. · Ve o siente cosas que no existen (alucina). Si usted está cuidando a alguien que está pasando por arsenio desintoxicación, llame si:  · La persona se desmaya (pierde el conocimiento). · La persona ve o siente cosas que no existen y ve u oye las mismas cosas muchas veces. · La persona está muy agitada y no se calma. · La persona se vuelve violenta o amenaza con ser Maeve Clarke. · La persona tiene convulsiones. Llame a weaver médico ahora mismo o busque atención médica inmediata si:  · Tiene fiebre cuco. · Tiene dolor abdominal intenso. · Tiembla mucho. Preste especial atención a los cambios en weaver armando y asegúrese de comunicarse con weaver médico si:  · No mejora jayden se esperaba. ¿Dónde puede encontrar más información en inglés? Jeremy Green a http://freddie-ida.info/. [place] Z841 en la búsqueda para aprender más acerca de \"Desintoxicación y síndrome de abstinencia del alcohol: Instrucciones de cuidado - [ Alcohol Detoxification and Withdrawal: Care Instructions ]. \"  Revisado: 25 godwin, 2016  Versión del contenido: 11.1  © 4754-8815 [employer], [employer]. Las instrucciones de cuidado fueron adaptadas bajo licencia por [employer] (which disclaims liability or warranty for this information).  Si usted tiene [place] afección médica o sobre estas instrucciones, siempre pregunte a huang profesional de armando. [employer] niega toda garantía o responsabilidad por huang uso de esta información.

## 2017-02-14 NOTE — PROGRESS NOTES
Received bedside verbal report from Varsha Ronni 71  Pt resting, no sign of distress. Call light within reach.     1637- pt dismissed accomp by staff and family

## 2017-02-14 NOTE — PROGRESS NOTES
Nutrition initial assessment/Plan of care      RECOMMENDATIONS:   1. Regular diet  2. Monitor weight and PO intake  3. RD to follow     GOALS:   1. PO intake meets >75% of protein/calorie needs by 2/21  2. Weight Maintenance/Gradual weight loss (1-2 lb) by 2/21    ASSESSMENT:   Per BMI of 31.0, weight is in the obesity classification. PO intake is adequate. Labs noted. Elevated liver enzymes. Nutrition recommendations listed. RD to follow. Nutrition Diagnoses:   Excessive alcohol intake related to alcohol abuse as evidenced by family reported patient is drinking about 12 beers daily. Nutrition Risk:  [] High  [] Moderate [x]  Low    SUBJECTIVE/OBJECTIVE:   Pt admitted for alcohol withdrawal. Pt speaks Maltese. Pt is eating breakfast (> 90%) at time of visit. Pt had decreased appetite PTA. He states that he is hungry this morning. Per family, pt drinks about 12 beers daily. Will follow. Information Obtained from:    [x] Chart Review   [x] Patient   [] Family/Caregiver   [] Nurse/Physician   [] Interdisciplinary Meeting/Rounds    Diet: Regular  Medications: [x] Reviewed (Folic acid, MVI, Thiamine)    Allergies: [x] Reviewed   Encounter Diagnoses     ICD-10-CM ICD-9-CM   1.  Alcohol withdrawal, with perceptual disturbance (HCC) F10.232 291.81     Past Medical History   Diagnosis Date    Alcoholic ([facility]       Labs:    Lab Results   Component Value Date/Time    Sodium 141 02/13/2017 07:42 AM    Potassium 3.7 02/13/2017 07:42 AM    Chloride 107 02/13/2017 07:42 AM    CO2 26 02/13/2017 07:42 AM    Anion gap 8 02/13/2017 07:42 AM    Glucose 81 02/13/2017 07:42 AM    BUN 7 02/13/2017 07:42 AM    Creatinine 0.60 02/13/2017 07:42 AM    Calcium 8.8 02/13/2017 07:42 AM    Magnesium 2.0 02/12/2017 01:29 PM    Phosphorus 3.0 02/12/2017 01:29 PM    Albumin 4.3 02/13/2017 07:42 AM     Anthropometrics: BMI (calculated): 31  Last 3 Recorded Weights in this Encounter    02/12/17 1256   Weight: 81.6 kg (180 lb)      Ht Readings from Last 1 Encounters:   02/12/17 5' 4\" (1.626 m)       IBW: 120 lb %IBW: 150%  Estimated Nutrition Needs: [x] MSJ  [] Other:  Calories: 3776-7702 kcal Based on:   [x] Actual BW    Protein:   60-75 g Based on:   [x] IBW    Fluid:       5161-6623 ml Based on:   [x] Actual BW      [x] No Cultural, Spiritism or ethnic dietary need identified.     [] Cultural, Spiritism and ethnic food preferences identified and addressed     Wt Status:  [] Normal (18.6 - 24.9) [] Underweight (<18.5) [] Overweight (25 - 29.9) [x] Mild Obesity (30 - 34.9)  [] Moderate Obesity (35 - 39.9) [] Morbid Obesity (40+)   [] Moderate Malnutrition [] Severe Malnutrition in the context of :     Nutrition Problems Identified:   [] Suboptimal PO intake   [] Food Allergies  [] Difficulty chewing/swallowing/poor dentition  [] Constipation/Diarrhea   [] Nausea/Vomiting   [x] None  [] Other:     Plan:   [] Therapeutic Diet  []  Obtained/adjusted food preferences/tolerances and/or snacks options   []  Supplements added   [] Occupational therapy following for feeding techniques  []  HS snack added   []  Modify diet texture   []  Modify diet for food allergies   []  Educate patient   []  Assist with menu selection   [x]  Monitor PO intake on meal rounds   [x]  Continue inpatient monitoring and intervention   []  Participated in discharge planning/Interdisciplinary rounds/Team meetings   []  Other:     Education Needs:   [] Not appropriate for teaching at this time due to:   [x] Identified and addressed    Nutrition Monitoring and Evaluation:  [x] Continue ongoing monitoring and intervention  [] Other    Jet Officer, [address]  Pager: [phone]

## 2017-02-14 NOTE — DISCHARGE SUMMARY
Discharge Summary    Patient: Jaylyn Gold               Sex: male          DOA: 2/12/2017         YOB: 1975      Age:  46 y.o.        LOS:  LOS: 2 days                Admit Date: 2/12/2017    Discharge Date: 2/14/2017    Discharge Medications:     Current Discharge Medication List      START taking these medications    Details   diazePAM (VALIUM) 5 mg tablet Take 1 tab every 6 hrs for 1 day then every 12 hrs for 1 day then stop  Qty: 6 Tab, Refills: 0             Follow-up: pcp    Discharge Condition: Stable    Activity: Activity as tolerated    Diet: Regular Diet    Labs:  Labs: Results:       Chemistry Recent Labs      02/13/17   0742  02/12/17   1329   GLU  81  88   NA  141  136   K  3.7  3.7   CL  107  103   CO2  26  23   BUN  7  9   CREA  0.60  0.74   CA  8.8  8.7   AGAP  8  10   BUCR  12  12   AP  37*  40*   TP  7.0  7.8   ALB  4.3  4.9   GLOB  2.7  2.9   AGRAT  1.6  1.7      CBC w/Diff Recent Labs      02/13/17   0742  02/12/17   1329   WBC  4.8  8.2   RBC  3.98*  3.91*   HGB  13.6  13.4   HCT  40.5  38.9   PLT  154  173   GRANS  41  61   LYMPH  37  25   EOS  5  0      Cardiac Enzymes Recent Labs      02/12/17   1329   CPK  368*   CKND1  0.6      Coagulation No results for input(s): PTP, INR, APTT in the last 72 hours. No lab exists for component: INREXT    Lipid Panel No results found for: CHOL, CHOLPOCT, CHOLX, CHLST, CHOLV, [id number], HDL, LDL, NLDLCT, DLDL, LDLC, DLDLP, 441047, VLDLC, VLDL, TGL, TGLX, TRIGL, [id number], TRIGP, TGLPOCT, L463669, CHHD, CHHDX   BNP No results for input(s): BNPP in the last 72 hours. Liver Enzymes Recent Labs      02/13/17   0742   TP  7.0   ALB  4.3   AP  37*   SGOT  176*      Thyroid Studies No results found for: T4, T3U, TSH, TSHEXT       Consults: None    Treatment Team: Treatment Team: Attending Provider: Gasper Huston MD; Scribe: Cami Castillo; Consulting Provider: Ninoska Sinclair MD; Physical Therapist: Miguelina Vanessa PT; Utilization Review: Ivelisse Baird; Care Manager: Mary Narvaez; Charge Nurse: Korina Cardona RN    Significant Diagnostic Studies: labs: No results found for this or any previous visit (from the past 24 hour(s)). Discharge diagnoses:    Problem List as of 2/14/2017  Date Reviewed: 2/12/2017          Codes Class Noted - Resolved    * (Principal)Alcohol withdrawal ([facility] ICD-10-CM: Q74.504  ICD-9-CM: 291.81  2/12/2017 - Present        Elevated liver enzymes ICD-10-CM: R74.8  ICD-9-CM: 790.5  2/12/2017 - Present        Hallucinations ICD-10-CM: R44.3  ICD-9-CM: 780.1  2/12/2017 - Present            Hospital Course:   Major issues addressed during hospitalization outlined  below. Janette Hilton is a 46 y.o. male,does not speak English,the family here translating. According to family members,patient drinks heavily since he was 27 y.old. He drinks 12 pack/daily of beer. Five days ago he decided to quit for the first time and next few days,he has become restless,shaky and he is seeing people and objects which don't exist.He is sweaty and has lost appetite. Katharina Davies family has decided to bring him to the ER. Tox screen showed alcohol<3. Pt is admitted for alcohol withdrawal with delirium Tremens. Patient was admitted for DT's/alcohol withdrawal.  Started patient on scheduled ativan and prn. Changed next day to q 6 hr valium. Patient did well and only had minor tremor on day of dc 2/14. Patient was prescribed 6 valium tabs for self taper. Plan f/u with pcp in 1 week.     Rafael Wallace MD  February 14, 2017        Total time spent 35 minutes

## 2017-02-14 NOTE — PROGRESS NOTES
Received patient, family at bedside. Citizen of [place] speaking only. Blue phone placed in room. 3413 Bedside and Verbal shift change report given to Dago Wise (oncoming nurse) by Valeriy Oropeza (offgoing nurse). Report included the following information SBAR and Kardex.

## 2017-02-16 ENCOUNTER — OFFICE VISIT (OUTPATIENT)
Dept: FAMILY MEDICINE CLINIC | Age: 42
End: 2017-02-16

## 2017-02-16 VITALS
HEIGHT: 64 IN | RESPIRATION RATE: 20 BRPM | SYSTOLIC BLOOD PRESSURE: 120 MMHG | HEART RATE: 81 BPM | WEIGHT: 172 LBS | OXYGEN SATURATION: 97 % | BODY MASS INDEX: 29.37 KG/M2 | DIASTOLIC BLOOD PRESSURE: 77 MMHG | TEMPERATURE: 98.2 F

## 2017-02-16 DIAGNOSIS — F10.932 ALCOHOL WITHDRAWAL, WITH PERCEPTUAL DISTURBANCE (HCC): Primary | ICD-10-CM

## 2017-02-16 DIAGNOSIS — R79.89 ABNORMAL CBC: ICD-10-CM

## 2017-02-16 DIAGNOSIS — R74.8 ELEVATED LIVER ENZYMES: ICD-10-CM

## 2017-02-16 RX ORDER — MULTIVIT WITH MINERALS/HERBS
1 TABLET ORAL DAILY
COMMUNITY

## 2017-02-16 RX ORDER — DIAZEPAM 5 MG/1
TABLET ORAL
Qty: 6 TAB | Refills: 0 | Status: SHIPPED | OUTPATIENT
Start: 2017-02-16

## 2017-02-16 NOTE — PROGRESS NOTES
Precious Ying is a 46 y.o.  male and presents with     Chief Complaint   Patient presents with   [facility]    Delirium Tremens (DTS)    Alcohol Problem    Abnormal liver tests         Subjective:  Mr. Yeison Contreras presents today with a friend. Today he feels much better. He states he is sleeping well. He has been taking the Diazepam and has been feeling much better. He states that he has not had alcohol and he has been sleeping very good since being released from the hospital.  He does have a tremor to his hands. Additional Concerns: NONE        Patient Active Problem List   Diagnosis Code    Alcohol withdrawal ([facility] F10.239    Elevated liver enzymes R74.8    Hallucinations R44.3    [facility] F10.20     Patient Active Problem List    Diagnosis Date Noted    Alcoholism /alcohol abuse ([facility] 02/16/2017    Alcohol withdrawal ([facility] 02/12/2017    Elevated liver enzymes 02/12/2017    Hallucinations 02/12/2017     Current Outpatient Prescriptions   Medication Sig Dispense Refill    b complex vitamins tablet Take 1 Tab by mouth daily.  diazePAM (VALIUM) 5 mg tablet Take 1 tab every 6 hrs for 1 day then every 12 hrs for 1 day then stop 6 Tab 0     No Known Allergies  Past Medical History   Diagnosis Date    Alcoholic ([facility]      History reviewed. No pertinent past surgical history.   Family History   Problem Relation Age of Onset    Diabetes Mother     Alcohol abuse Father     Asthma Sister     No Known Problems Brother     Diabetes Maternal Grandmother     No Known Problems Sister     No Known Problems Sister     No Known Problems Sister     No Known Problems Brother     No Known Problems Brother      Social History   Substance Use Topics    Smoking status: Never Smoker    Smokeless tobacco: Never Used    Alcohol use Yes       ROS     History obtained from the patient  General ROS: negative for - chills, fatigue, fever, hot flashes or night sweats  Psychological ROS: negative for - anxiety, depression, hallucinations, irritability, sleep disturbances or suicidal ideation  Ophthalmic ROS: negative for - blurry vision or photophobia  ENT ROS: negative for - headaches, nasal congestion, sinus pain, sneezing or sore throat  Respiratory ROS: no cough, shortness of breath, or wheezing  Cardiovascular ROS: no chest pain or dyspnea on exertion  Gastrointestinal ROS: no abdominal pain, change in bowel habits, or black or bloody stools  Genito-Urinary ROS: no dysuria, trouble voiding, or hematuria  Musculoskeletal ROS: negative  Neurological ROS: no TIA or stroke symptoms  positive for - tremors  negative for - confusion, dizziness, gait disturbance, impaired coordination/balance, numbness/tingling, seizures, speech problems or weakness      Objective:  Vitals:    02/16/17 1242   BP: 120/77   Pulse: 81   Resp: 20   Temp: 98.2 °F (36.8 °C)   TempSrc: Oral   SpO2: 97%   Weight: 172 lb (78 kg)   Height: 5' 4\" (1.626 m)   PainSc:   0 - No pain       PHYSICAL EXAM    Alert, well appearing, and in no distress, oriented to person, place, and time and normal appearing weight  Mental status - alert, oriented to person, place, and time, normal mood, behavior, speech, dress, motor activity, and thought processes, affect appropriate to mood  Chest - clear to auscultation, no wheezes, rales or rhonchi, symmetric air entry  Heart - normal rate, regular rhythm, normal S1, S2, no murmurs, rubs, clicks or gallops, no JVD  Abdomen - soft, nontender, nondistended, no masses or organomegaly, bowel sounds normal, hepatomegaly noted  no CVA tenderness  Neurological - alert, oriented, normal speech noted, tremor noted with hands. LABS   CBC, CMP, lipase ordered today. Assessment/Plan:    Alcohol withdrawal with perceptual disturbance - Check labs today. Reordered diazepam self taper as he continues to experience tremor and nervousness.   Elevated liver enzymes - Repeat LFT's today  Abnormal CBC - Repeat CBC today. Alcoholism/alcohol abuse - List AA meetings        Lab review: orders written for new lab studies as appropriate; see orders      I have discussed the diagnosis with the patient and the intended plan as seen in the above orders. The patient has received an after-visit summary and questions were answered concerning future plans. I have discussed medication side effects and warnings with the patient as well. I have reviewed the plan of care with the patient, accepted their input and they are in agreement with the treatment goals. Follow-up Disposition:  Return in about 1 month (around 3/16/2017), or if symptoms worsen or fail to improve, for f/u in 1 month for alcohol withdrawl. More than 1/2 of this 30 minute visit was spent in counseling and coordination of care, as described above.       Nia Petersen, RN, MSN, FNP-C

## 2017-02-16 NOTE — MR AVS SNAPSHOT
Visit Information Date & Time Provider Department Dept. Phone Encounter #  
 2/16/2017 12:45 PM Ria Felder, Tariq [facility] [phone] [id number] Follow-up Instructions Return in about 1 month (around 3/16/2017), or if symptoms worsen or fail to improve, for f/u in 1 month for alcohol withdrawl. Upcoming Health Maintenance Date Due DTaP/Tdap/Td series (1 - Tdap) 12/8/1998 INFLUENZA AGE 9 TO ADULT 8/1/2016 Allergies as of 2/16/2017  Review Complete On: 2/16/2017 By: Ria Felder NP No Known Allergies Current Immunizations  Never Reviewed Name Date Influenza Vaccine (Quad) PF 2/14/2017  3:52 PM  
  
 Not reviewed this visit You Were Diagnosed With   
  
 Codes Comments Alcohol withdrawal, with perceptual disturbance ([facility]    -  Primary ICD-10-CM: F11.378 ICD-9-CM: 291.81 Elevated liver enzymes     ICD-10-CM: R74.8 ICD-9-CM: 790.5 Abnormal CBC     ICD-10-CM: R79.89 ICD-9-CM: 790.6 Alcoholism /alcohol abuse ([facility]     ICD-10-CM: H25.97 ICD-9-CM: 303.90 Vitals BP Pulse Temp Resp Height(growth percentile) Weight(growth percentile) 120/77 81 98.2 °F (36.8 °C) (Oral) 20 5' 4\" (1.626 m) 172 lb (78 kg) SpO2 BMI Smoking Status 97% 29.52 kg/m2 Never Smoker BMI and BSA Data Body Mass Index Body Surface Area  
 29.52 kg/m 2 1.88 m 2 Preferred Pharmacy Pharmacy Name Phone [facility] [address], [address] [place] [address] [phone] Your Updated Medication List  
  
   
This list is accurate as of: 2/16/17  1:33 PM.  Always use your most recent med list.  
  
  
  
  
 b complex vitamins tablet Take 1 Tab by mouth daily. diazePAM 5 mg tablet Commonly known as:  VALIUM Take 1 tab every 6 hrs for 1 day then every 12 hrs for 1 day then stop Prescriptions Printed  Refills  
 diazePAM (VALIUM) 5 mg tablet 0  
 Sig: Take 1 tab every 6 hrs for 1 day then every 12 hrs for 1 day then stop Class: Print Follow-up Instructions Return in about 1 month (around 3/16/2017), or if symptoms worsen or fail to improve, for f/u in 1 month for alcohol withdrawl. To-Do List   
 02/16/2017 Lab:  CBC WITH AUTOMATED DIFF   
  
 02/16/2017 Lab:  LIPASE   
  
 02/16/2017 Lab:  METABOLIC PANEL, COMPREHENSIVE Introducing [facility]! Nina Rider introduces [employer] patient portal. Now you can access parts of your medical record, email your doctor's office, and request medication refills online. 1. In your internet browser, go to [email or web address] 2. Click on the First Time User? Click Here link in the Sign In box. You will see the New Member Sign Up page. 3. Enter your [employer]ix Access Code exactly as it appears below. You will not need to use this code after youve completed the sign-up process. If you do not sign up before the expiration date, you must request a new code. · [employer] Access Code: [email or web address] Expires: 5/13/2017  1:40 PM 
 
4. Enter the last four digits of your Social Security Number (xxxx) and Date of Birth (mm/dd/yyyy) as indicated and click Submit. You will be taken to the next sign-up page. 5. Create a Premonix ID. This will be your Premonix login ID and cannot be changed, so think of one that is secure and easy to remember. 6. Create a Premonix password. You can change your password at any time. 7. Enter your Password Reset Question and Answer. This can be used at a later time if you forget your password. 8. Enter your e-mail address. You will receive e-mail notification when new information is available in [address] 9. Click Sign Up. You can now view and download portions of your medical record. 10. Click the Download Summary menu link to download a portable copy of your medical information. If you have questions, please visit the Frequently Asked Questions section of the [employer] website. Remember, [employer] is NOT to be used for urgent needs. For medical emergencies, dial 911. Now available from your iPhone and Android! Please provide this summary of care documentation to your next provider. Your primary care clinician is listed as Elena Barber. If you have any questions after today's visit, please call [phone].

## 2017-02-17 LAB
ALBUMIN SERPL-MCNC: 4.4 G/DL (ref 3.5–5.5)
ALBUMIN/GLOB SERPL: 1.5 {RATIO} (ref 1.1–2.5)
ALP SERPL-CCNC: 37 IU/L (ref 39–117)
ALT SERPL-CCNC: 155 IU/L (ref 0–44)
AST SERPL-CCNC: 135 IU/L (ref 0–40)
BASOPHILS # BLD AUTO: 0.1 X10E3/UL (ref 0–0.2)
BASOPHILS NFR BLD AUTO: 1 %
BILIRUB SERPL-MCNC: 0.4 MG/DL (ref 0–1.2)
BUN SERPL-MCNC: 9 MG/DL (ref 6–20)
BUN/CREAT SERPL: 13 (ref 8–19)
CALCIUM SERPL-MCNC: 9.4 MG/DL (ref 8.7–10.2)
CHLORIDE SERPL-SCNC: 98 MMOL/L (ref 96–106)
CO2 SERPL-SCNC: 25 MMOL/L (ref 18–29)
CREAT SERPL-MCNC: 0.69 MG/DL (ref 0.76–1.27)
EOSINOPHIL # BLD AUTO: 0.3 X10E3/UL (ref 0–0.4)
EOSINOPHIL NFR BLD AUTO: 4 %
ERYTHROCYTE [DISTWIDTH] IN BLOOD BY AUTOMATED COUNT: 13.5 % (ref 12.3–15.4)
GLOBULIN SER CALC-MCNC: 3 G/DL (ref 1.5–4.5)
GLUCOSE SERPL-MCNC: 69 MG/DL (ref 65–99)
HCT VFR BLD AUTO: 41.1 % (ref 37.5–51)
HGB BLD-MCNC: 13.9 G/DL (ref 12.6–17.7)
IMM GRANULOCYTES # BLD: 0 X10E3/UL (ref 0–0.1)
IMM GRANULOCYTES NFR BLD: 0 %
LIPASE SERPL-CCNC: 119 U/L (ref 0–59)
LYMPHOCYTES # BLD AUTO: 2.1 X10E3/UL (ref 0.7–3.1)
LYMPHOCYTES NFR BLD AUTO: 34 %
MCH RBC QN AUTO: 34 PG (ref 26.6–33)
MCHC RBC AUTO-ENTMCNC: 33.8 G/DL (ref 31.5–35.7)
MCV RBC AUTO: 101 FL (ref 79–97)
MONOCYTES # BLD AUTO: 1 X10E3/UL (ref 0.1–0.9)
MONOCYTES NFR BLD AUTO: 16 %
NEUTROPHILS # BLD AUTO: 2.8 X10E3/UL (ref 1.4–7)
NEUTROPHILS NFR BLD AUTO: 45 %
PLATELET # BLD AUTO: 210 X10E3/UL (ref 150–379)
POTASSIUM SERPL-SCNC: 4 MMOL/L (ref 3.5–5.2)
PROT SERPL-MCNC: 7.4 G/DL (ref 6–8.5)
RBC # BLD AUTO: 4.09 X10E6/UL (ref 4.14–5.8)
SODIUM SERPL-SCNC: 140 MMOL/L (ref 134–144)
WBC # BLD AUTO: 6.1 X10E3/UL (ref 3.4–10.8)

## 2017-03-16 ENCOUNTER — OFFICE VISIT (OUTPATIENT)
Dept: FAMILY MEDICINE CLINIC | Age: 42
End: 2017-03-16

## 2017-03-16 VITALS
SYSTOLIC BLOOD PRESSURE: 124 MMHG | OXYGEN SATURATION: 99 % | TEMPERATURE: 97.8 F | DIASTOLIC BLOOD PRESSURE: 89 MMHG | BODY MASS INDEX: 30.9 KG/M2 | RESPIRATION RATE: 20 BRPM | WEIGHT: 181 LBS | HEIGHT: 64 IN | HEART RATE: 73 BPM

## 2017-03-16 DIAGNOSIS — S29.012A MUSCLE STRAIN OF RIGHT UPPER BACK, INITIAL ENCOUNTER: ICD-10-CM

## 2017-03-16 DIAGNOSIS — R74.8 ELEVATED LIVER ENZYMES: ICD-10-CM

## 2017-03-16 DIAGNOSIS — F10.930 ALCOHOL WITHDRAWAL, UNCOMPLICATED (HCC): ICD-10-CM

## 2017-03-16 RX ORDER — IBUPROFEN 800 MG/1
800 TABLET ORAL
Qty: 120 TAB | Refills: 1 | Status: SHIPPED | OUTPATIENT
Start: 2017-03-16

## 2017-03-16 RX ORDER — CYCLOBENZAPRINE HCL 10 MG
10 TABLET ORAL 2 TIMES DAILY
Qty: 90 TAB | Refills: 0 | Status: SHIPPED | OUTPATIENT
Start: 2017-03-16

## 2017-03-16 NOTE — PROGRESS NOTES
1. Have you been to the ER, urgent care clinic since your last visit? Hospitalized since your last visit? No    2. Have you seen or consulted any other health care providers outside of the [address] [place] [address] since your last visit? Include any pap smears or colon screening.  No

## 2017-03-16 NOTE — PROGRESS NOTES
Sidney Love is a 46 y.o.  male and presents with    Chief Complaint   Patient presents with    Results    Alcohol Problem    Back Pain           Subjective:  Mr. Karina Hoffman presents today for follow up with his cousin for interpretation. He denies any alcohol withdrawal symptoms or depression. He states that he is sleeping better and not having any more nightmares. He has been having upper back pain on his right side. He lifts carpets for installation at work. He recently saw Dr. Juan Campoverde at [address] [facility] and he prescribed Prozac for him. The patient states that he doesn't need to take it as he doesn't have a desire to drink alcohol again nor is he depressed. He stated that his family took him there because they have a  there. Additional Concerns: It was explained to him that he will need to decide who he picks as his PCM. It was also explained that he can choose who he wants to see but it will be one or the other. Patient Active Problem List   Diagnosis Code    Alcohol withdrawal ([facility] F10.239    Elevated liver enzymes R74.8    Hallucinations R44.3    Alcoholism /alcohol abuse ([facility] F10.20     Patient Active Problem List    Diagnosis Date Noted    Alcoholism /alcohol abuse ([facility] 02/16/2017    Alcohol withdrawal ([facility] 02/12/2017    Elevated liver enzymes 02/12/2017    Hallucinations 02/12/2017     Current Outpatient Prescriptions   Medication Sig Dispense Refill    cyclobenzaprine (FLEXERIL) 10 mg tablet Take 1 Tab by mouth two (2) times a day. 90 Tab 0    ibuprofen (MOTRIN) 800 mg tablet Take 1 Tab by mouth every eight (8) hours as needed for Pain. 120 Tab 1    b complex vitamins tablet Take 1 Tab by mouth daily.  diazePAM (VALIUM) 5 mg tablet Take 1 tab every 6 hrs for 1 day then every 12 hrs for 1 day then stop 6 Tab 0     No Known Allergies  Past Medical History:   Diagnosis Date    Alcoholic ([facility]      History reviewed.  No pertinent surgical history. Family History   Problem Relation Age of Onset    Diabetes Mother     Alcohol abuse Father     Asthma Sister     No Known Problems Brother     Diabetes Maternal Grandmother     No Known Problems Sister     No Known Problems Sister     No Known Problems Sister     No Known Problems Brother     No Known Problems Brother      Social History   Substance Use Topics    Smoking status: Never Smoker    Smokeless tobacco: Never Used    Alcohol use Yes       ROS   History obtained from the patient  General ROS: negative for - chills, fatigue, fever, hot flashes or sleep disturbance  Psychological ROS: negative for - anxiety, behavioral disorder, depression, hallucinations, sleep disturbances or suicidal ideation  Ophthalmic ROS: negative for - blurry vision, eye pain or photophobia  ENT ROS: negative for - headaches or nasal congestion  Respiratory ROS: no cough, shortness of breath, or wheezing  Cardiovascular ROS: no chest pain or dyspnea on exertion  Gastrointestinal ROS: no abdominal pain, change in bowel habits, or black or bloody stools  Genito-Urinary ROS: no dysuria, trouble voiding, or hematuria  Musculoskeletal ROS: positive for - muscle pain and pain in back - right, upper  Neurological ROS: no TIA or stroke symptoms    All other systems reviewed and are negative.       Objective:  Vitals:    03/16/17 1048   BP: 124/89   Pulse: 73   Resp: 20   Temp: 97.8 °F (36.6 °C)   TempSrc: Oral   SpO2: 99%   Weight: 181 lb (82.1 kg)   Height: 5' 4\" (1.626 m)   PainSc:   4   PainLoc: Shoulder       PHYSICAL EXAM  Alert, well appearing, and in no distress, oriented to person, place, and time and normal appearing weight  Mental status - alert, oriented to person, place, and time, normal mood, behavior, speech, dress, motor activity, and thought processes, affect appropriate to mood  Chest - clear to auscultation, no wheezes, rales or rhonchi, symmetric air entry  Heart - normal rate, regular rhythm, normal S1, S2, no murmurs, rubs, clicks or gallops, no JVD  Abdomen - bowel sounds normal  Musculoskeletal -  muscular tenderness noted to upper right back, painful to palpation. Extremities - peripheral pulses normal, no pedal edema, no clubbing or cyanosis      LABS     Lab Results  Component Value Date/Time   WBC 6.1 02/16/2017 12:00 AM   HGB 13.9 02/16/2017 12:00 AM   HCT 41.1 02/16/2017 12:00 AM   PLATELET 759 61/42/8030 12:00 AM    02/16/2017 12:00 AM       Lab Results   Component Value Date/Time    Sodium 140 02/16/2017 12:00 AM    Potassium 4.0 02/16/2017 12:00 AM    Chloride 98 02/16/2017 12:00 AM    CO2 25 02/16/2017 12:00 AM    Anion gap 8 02/13/2017 07:42 AM    Glucose 69 02/16/2017 12:00 AM    BUN 9 02/16/2017 12:00 AM    Creatinine 0.69 02/16/2017 12:00 AM    BUN/Creatinine ratio 13 02/16/2017 12:00 AM    GFR est  02/16/2017 12:00 AM    GFR est non- 02/16/2017 12:00 AM    Calcium 9.4 02/16/2017 12:00 AM    Bilirubin, total 0.4 02/16/2017 12:00 AM    ALT (SGPT) 155 02/16/2017 12:00 AM    AST (SGOT) 135 02/16/2017 12:00 AM    Alk. phosphatase 37 02/16/2017 12:00 AM    Protein, total 7.4 02/16/2017 12:00 AM    Albumin 4.4 02/16/2017 12:00 AM    Globulin 2.7 02/13/2017 07:42 AM    A-G Ratio 1.5 02/16/2017 12:00 AM     Lipase 119 (H) 0 - 59 U/L Final         Assessment/Plan:    Alcoholism/alcohol abuse - He has not drank since his hospitalization. Alcohol withdrawal - Resolved  Elevated liver enzymes - States he had labs at another providers office. He states that he will probably go to that doctor since they have an . Muscle strain of right upper back - prescribed Flexeril and ibuprofen as needed for pain. Lab review: no lab studies available for review at time of visit      I have discussed the diagnosis with the patient and the intended plan as seen in the above orders.   The patient has received an after-visit summary and questions were answered concerning future plans. I have discussed medication side effects and warnings with the patient as well. I have reviewed the plan of care with the patient, accepted their input and they are in agreement with the treatment goals. Follow-up Disposition:  Return if symptoms worsen or fail to improve, for f/u as needed. More than 1/2 of this 30 minute visit was spent in counseling and coordination of care, as described above.       Alanna Kinney RN, MSN, FNP-C

## 2017-03-16 NOTE — MR AVS SNAPSHOT
Visit Information Date & Time Provider Department Dept. Phone Encounter #  
 3/16/2017 11:00 AM Anthony Riosor, [address] [phone] [id number] Follow-up Instructions Return if symptoms worsen or fail to improve, for f/u as needed. Upcoming Health Maintenance Date Due Pneumococcal 19-64 Medium Risk (1 of 1 - PPSV23) 12/8/1996 DTaP/Tdap/Td series (1 - Tdap) 12/8/1998 Allergies as of 3/16/2017  Review Complete On: 3/16/2017 By: Anthony Hathaway NP No Known Allergies Current Immunizations  Never Reviewed Name Date Influenza Vaccine (Quad) PF 2/14/2017  3:52 PM  
  
 Not reviewed this visit You Were Diagnosed With   
  
 Codes Comments Alcoholism /alcohol abuse ([facility] [address]    -  Primary ICD-10-CM: F10.20 ICD-9-CM: 303.90 Alcohol withdrawal, uncomplicated (HCC)     AXJ-99-OY: R07.562 ICD-9-CM: 291.81 Elevated liver enzymes     ICD-10-CM: R74.8 ICD-9-CM: 790.5 Muscle strain of left upper back, initial encounter     ICD-10-CM: S29.012A ICD-9-CM: 847.1 Vitals BP Pulse Temp Resp Height(growth percentile) Weight(growth percentile) 124/89 73 97.8 °F (36.6 °C) (Oral) 20 5' 4\" (1.626 m) 181 lb (82.1 kg) SpO2 BMI Smoking Status 99% 31.07 kg/m2 Never Smoker BMI and BSA Data Body Mass Index Body Surface Area 31.07 kg/m 2 1.93 m 2 Preferred Pharmacy Pharmacy Name Phone [facility] [address], [address] [phone] Your Updated Medication List  
  
   
This list is accurate as of: 3/16/17 11:45 AM.  Always use your most recent med list.  
  
  
  
  
 b complex vitamins tablet Take 1 Tab by mouth daily. cyclobenzaprine 10 mg tablet Commonly known as:  FLEXERIL Take 1 Tab by mouth two (2) times a day. diazePAM 5 mg tablet Commonly known as:  VALIUM Take 1 tab every 6 hrs for 1 day then every 12 hrs for 1 day then stop ibuprofen 800 mg tablet Commonly known as:  MOTRIN Take 1 Tab by mouth every eight (8) hours as needed for Pain. Prescriptions Sent to Pharmacy Refills  
 cyclobenzaprine (FLEXERIL) 10 mg tablet 0 Sig: Take 1 Tab by mouth two (2) times a day. Class: Normal  
 Pharmacy: [id number], [address] Ph #: [phone] Route: Oral  
 ibuprofen (MOTRIN) 800 mg tablet 1 Sig: Take 1 Tab by mouth every eight (8) hours as needed for Pain. Class: Normal  
 Pharmacy: [id number], [address] Ph #: [phone] Route: Oral  
  
Follow-up Instructions Return if symptoms worsen or fail to improve, for f/u as needed. Introducing [facility]! Barbara Price introduces [employer] patient portal. Now you can access parts of your medical record, email your doctor's office, and request medication refills online. 1. In your internet browser, go to [email or web address] 2. Click on the First Time User? Click Here link in the Sign In box. You will see the New Member Sign Up page. 3. Enter your [employer] Access Code exactly as it appears below. You will not need to use this code after youve completed the sign-up process. If you do not sign up before the expiration date, you must request a new code. · [employer] Access Code: [email or web address] Expires: 5/13/2017  2:40 PM 
 
4. Enter the last four digits of your Social Security Number (xxxx) and Date of Birth (mm/dd/yyyy) as indicated and click Submit. You will be taken to the next sign-up page. 5. Create a [employer] ID. This will be your [employer] login ID and cannot be changed, so think of one that is secure and easy to remember. 6. Create a [employer] password. You can change your password at any time. 7. Enter your Password Reset Question and Answer. This can be used at a later time if you forget your password. 8. Enter your e-mail address. You will receive e-mail notification when new information is available in [address] 9. Click Sign Up. You can now view and download portions of your medical record. 10. Click the Download Summary menu link to download a portable copy of your medical information. If you have questions, please visit the Frequently Asked Questions section of the [employer] website. Remember, [employer] is NOT to be used for urgent needs. For medical emergencies, dial 911. Now available from your iPhone and Android! Please provide this summary of care documentation to your next provider. Your primary care clinician is listed as Melina Estrada. If you have any questions after today's visit, please call [phone].

## 2024-10-17 NOTE — ANCILLARY DISCHARGE INSTRUCTIONS
Patient has pcp follow up with NP [facility] on 2/16/17 at 12:15pm. Post-Care Instructions: I reviewed with the patient in detail post-care instructions. Patient is to wear sunprotection, and avoid picking at any of the treated lesions. Pt may apply Vaseline to crusted or scabbing areas. Medical Necessity Clause: This procedure was medically necessary because the lesions that were treated were: Spray Paint Text: The liquid nitrogen was applied to the skin utilizing a spray paint frosting technique. Show Spray Paint Technique Variable?: Yes Number Of Freeze-Thaw Cycles: 1 freeze-thaw cycle Render Note In Bullet Format When Appropriate: No Detail Level: Simple Consent: The patient's consent was obtained including but not limited to risks of crusting, scabbing, blistering, scarring, darker or lighter pigmentary change, recurrence, incomplete removal and infection. Medical Necessity Information: It is in your best interest to select a reason for this procedure from the list below. All of these items fulfill various CMS LCD requirements except the new and changing color options.